# Patient Record
Sex: MALE | Race: WHITE | Employment: FULL TIME | URBAN - METROPOLITAN AREA
[De-identification: names, ages, dates, MRNs, and addresses within clinical notes are randomized per-mention and may not be internally consistent; named-entity substitution may affect disease eponyms.]

---

## 2017-03-13 PROBLEM — M19.90 ARTHRITIS: Status: ACTIVE | Noted: 2017-03-13

## 2017-03-13 PROBLEM — G43.909 MIGRAINES: Status: ACTIVE | Noted: 2017-03-13

## 2017-03-13 PROBLEM — E78.5 HYPERLIPIDEMIA, UNSPECIFIED: Status: ACTIVE | Noted: 2017-03-13

## 2017-03-13 PROBLEM — F51.01 PRIMARY INSOMNIA: Chronic | Status: ACTIVE | Noted: 2017-03-13

## 2017-03-13 PROBLEM — M19.90 OSTEOARTHRITIS: Status: ACTIVE | Noted: 2017-03-13

## 2017-03-13 PROBLEM — R35.1 NOCTURIA: Status: ACTIVE | Noted: 2017-03-13

## 2017-03-13 PROBLEM — K58.9 IBS (IRRITABLE BOWEL SYNDROME): Status: ACTIVE | Noted: 2017-03-13

## 2017-03-13 PROBLEM — Z87.442 HISTORY OF KIDNEY STONES: Status: ACTIVE | Noted: 2017-03-13

## 2017-04-07 ENCOUNTER — HOSPITAL ENCOUNTER (OUTPATIENT)
Dept: PHYSICAL THERAPY | Age: 57
Discharge: HOME | End: 2017-04-07
Payer: COMMERCIAL

## 2017-04-07 DIAGNOSIS — M25.511 CHRONIC RIGHT SHOULDER PAIN: ICD-10-CM

## 2017-04-07 DIAGNOSIS — G89.29 CHRONIC RIGHT SHOULDER PAIN: ICD-10-CM

## 2017-04-07 DIAGNOSIS — M54.2 NECK PAIN: ICD-10-CM

## 2017-04-07 PROCEDURE — 97140 MANUAL THERAPY 1/> REGIONS: CPT

## 2017-04-07 PROCEDURE — 97110 THERAPEUTIC EXERCISES: CPT

## 2017-04-07 PROCEDURE — 97162 PT EVAL MOD COMPLEX 30 MIN: CPT

## 2017-04-07 NOTE — PROGRESS NOTES
She Rhodes  : [facility] [address], [place], [address] [place] [address]  [phone]   [phone]         OUTPATIENT PHYSICAL THERAPY:Initial Assessment 2017    ICD-10: Treatment Diagnosis 1: cervical radiculopathy (M54.12)              Treatment Diagnosis 2: right shoulder pain (M25.511)  Precautions: None  Allergies:   Review of patient's allergies indicates no known allergies. Fall Risk Score: 2 (? 5 = High Risk)  MD Orders: Evaluate and Treat MEDICAL/REFERRING DIAGNOSIS:  Neck pain [M54.2]  Chronic right shoulder pain [M25.511, G89.29]   DATE OF ONSET: 2016 with gradual onset of pain in the neck and shoulder, with recent onset of right arm numbness into the thumb  REFERRING PHYSICIAN: Joel Fine MD  RETURN PHYSICIAN APPOINTMENT: 2017     INITIAL ASSESSMENT:  Mr. Mireya Zamorano is a 64 y.o. male presenting with right sided arm and neck symptoms that started in 2016 gradually and recently produced right arm numbness into the thumb. He denies any history of trauma, but does report he sits a lot at a desk as a  and with flying for work every week. He reports no weakness into the upper extremity, but reports the pain is limiting his sleep, ability to sit at a desk, and ability to perform activities above shoulder height. He reports being eager to reduce his pain and learn to control his symptoms on his own. He is a good candidate for skilled intervention with services to include manual therapy, modalities as needed, therapeutic exercises, postural re-education, and activity modification. PROBLEM LIST (Impacting functional limitations):  1. Decreased Strength  2. Decreased ADL/Functional Activities  3. Decreased Transfer Abilities  4. Decreased Ambulation Ability/Technique  5. Increased Pain  6. Decreased Activity Tolerance  7. Increased Fatigue  8.  Decreased Flexibility/Joint Mobility INTERVENTIONS PLANNED:  1. Cold  2. Cryotherapy  3. Electrical Stimulation  4. Heat  5. Home Exercise Program (HEP)  6. Manual Therapy  7. Neuromuscular Re-education/Strengthening  8. Range of Motion (ROM)  9. Therapeutic Exercise/Strengthening  10. Ultrasound (US)   TREATMENT PLAN:  Effective Dates: 4/7/2017 TO 6/1/2017. Frequency/Duration: 1 times a week for 8 weeks  GOALS: (Goals have been discussed and agreed upon with patient.)  Short-Term Functional Goals: Time Frame: 4/7/2017 to 5/5/2017  1. Patient demonstrates independence with home exercise program without verbal cueing provided by therapist.  2. Improve seated posture with decreased forward head, forward shoulders and thoracic kyphosis with use of lumbar roll and appropriate adjustment of computer work space. 3. Improve flexibility of pectoralis minor and major in order to improve neutral spine posture for work and with travelling. Discharge Goals: Time Frame: 4/7/2017 to 6/1/2017  1. Improve pain to 4/10 at the most with sitting for long periods of time, flying weekly, and sleeping. 2. Improve gross strength of rotator cuff, thoracic spinal extensors, deep cervical flexors, and scapular retractors to at least 4/5 in order to normalize tolerance to sitting for long periods of time. 3. Improve tenderness to palpation and spasm of right cervical paraspinals, scalenes, upper trapezius, and levator scapulae in order to improve tolerance to sleeping. 4. Improve ability for patient to control upper extremity symptoms with use of cervical traction at home and with travelling. 5. Improve modification of activities with reduction of cervical extension by reducing overhead activity and bringing work to eye and shoulder level. 6. Improve Disabilities of the Arm, Shoulder and Hand Index score to 15/55 from 20/55.   Rehabilitation Potential For Stated Goals: Good  Regarding Oscar Reus therapy, I certify that the treatment plan above will be carried out by a therapist or under their direction. Thank you for this referral,  Raad Abdi PT     Referring Physician Signature: Jah Mitchell MD              Date                    The information in this section was collected on 4/7/2017 (except where otherwise noted). HISTORY:   History of Present Injury/Illness (Reason for Referral):  Mr. Beryle Hews is a 64 y.o. male presenting with right sided arm and neck symptoms that started in 8/2016 gradually and recently produced right arm numbness into the thumb. He denies any history of trauma, but does report he sits a lot at a desk as a  and with flying for work every week. He reports no weakness into the upper extremity, but reports the pain is limiting his sleep, ability to sit at a desk, and ability to perform activities above shoulder height. He reports being eager to reduce his pain and learn to control his symptoms on his own. Past Medical History/Comorbidities:   Mr. Beryle Hews  has a past medical history of Allergic rhinitis, cause unspecified (4/3/2014); Arthritis (3/13/2017); History of kidney stones (3/13/2017); Hyperlipidemia, unspecified (3/13/2017); IBS (irritable bowel syndrome) (3/13/2017); Migraines (3/13/2017); Nocturia (3/13/2017); and Osteoarthritis (3/13/2017). Mr. Beryle Hews  has a past surgical history that includes other surgical and other surgical.  Social History/Living Environment:    Patient lives at home with wife but flies weekly to [address] [place] [address] for work and lives in an apartment. Therefore, he flies 2 times a week. Otherwise, he is independent to modified independence with all ADLs, household chores, and household activities such as home improvements and yard work. Prior Level of Function/Work/Activity:  Independent without dysfunction or pain. Patient works as a  which requires sitting for long periods of time at a computer.   He is also very active in and around his home with yard work and home improvements. Dominant Side:         LEFT  Current Medications:       Current Outpatient Prescriptions:     zolpidem (AMBIEN) 10 mg tablet, Take 1 Tab by mouth nightly as needed for Sleep. Max Daily Amount: 10 mg., Disp: 30 Tab, Rfl: 1    finasteride (PROSCAR) 5 mg tablet, Take 1 Tab by mouth daily. , Disp: 90 Tab, Rfl: 3    montelukast (SINGULAIR) 10 mg tablet, Take 10 mg by mouth daily. , Disp: , Rfl:     EPINEPHrine (EPIPEN) 0.3 mg/0.3 mL injection, 0.3 mg by IntraMUSCular route once as needed. , Disp: , Rfl:     SYRINGE W-NEEDLE,DISPOSAB,1 ML (ALLERGY SYRINGE), by Does Not Apply route., Disp: , Rfl:     aspirin 81 mg chewable tablet, Take 81 mg by mouth daily. , Disp: , Rfl:     calcium-vitamin D (OYSTER SHELL) 500 mg(1,250mg) -200 unit per tablet, Take 1 Tab by mouth two (2) times daily (with meals). , Disp: , Rfl:     fexofenadine (ALLEGRA) 180 mg tablet, Take  by mouth., Disp: , Rfl:     triamcinolone (NASACORT AQ) 55 mcg nasal inhaler, 2 Sprays daily. , Disp: , Rfl:    Date Last Reviewed:  4/7/2017   Number of Personal Factors/Comorbidities that affect the Plan of Care (job working at a computer, and high levels of activities in and around his home): 1-2: [address]:     Patient denies any increase of symptoms with coughing, sneezing or valsalva maneuver. Patient denies any headaches, changes in vision, dizziness, vertigo, nausea, drop attacks, black outs, tinnitus, dysphagia, dysarthria, LE symptoms or bowel/bladder dysfunction. Observation/Orthostatic Postural Assessment: Patient sits with mild to moderate forward head, forward shoulders and thoracic kyphosis. He is able to pull himself into neutral grossly without cuing, but still tends to hold his head in extension. He is able to reverse this with cuing but returns to this position due to poor postural awareness. No significant deformity is noted.      Palpation: Gross tenderness to palpation and spasm of scalenes, upper trapezius, levator scapulae and suboccipitals on the right. Flexibility is severely limited of the upper trapezius, levator scapulae, suboccipitals, and pectoralis minor and major. Vertebral-Basilar Screen: Hautant's test is negative. Cranial extension test is negative. ROM: AROM of bilateral upper extremities is symmetrical, WNL and pain free. Cervical extension (degrees): 55°   Cervical flexion: 65°   Cervical left side bend: 45°   Cervical right side bend: 45°   Cervical left rotation: 70°   Cervical right rotation: 70°     Strength:  Deep cervical flexors 3-/5, scapular retractors 4/5, thoracic spinal extensors 4-/5. Joint Mobility:  Mild to moderate limitations with cervical traction. Severe limitations noted with posterior glide of occiput on C1 bilaterally. Special Tests: Ligament stress tests performed through upper cervical spine for transverse ligament including Sharp-Elizabeth test is negative, and [place]-Axis shear test is negative. [place]-Axis side flexion test for integrity of alar ligament is negative. Spurling test is positive on the right for upper extremity symptoms. Cervical distraction is positive for relief of neck pain and arm symptoms on the right. Neurovascular testing for thoracic outlet syndrome is negative. Negative impingement testing (Neer and Hartmann), negative painful arc, negative rotator cuff testing, positive Meck[place]'s for clunk but no pain in the shoulder, positive Clunk test.    Neurological Screen:  Myotomes: Key muscle strength testing for bilateral UE is WNL. Dermatomes: Sensation testing through bilateral upper quadrants for light touch is WNL. Reflexes: Biceps (C5), brachioradialis (C6), and triceps (C7) are **2+, 2+ and 2+. Neural tension tests: Upper limb tension test is negative. Slump test is negative. Functional Mobility:  Patient reports independence to modified independence with all ADLs, household chores, and personal hygiene.   However, he requires assistance with moderate to heavy lifting and activities performed overhead. Body Structures Involved:  1. Bones  2. Joints  3. Muscles  4. Ligaments Body Functions Affected:  1. Sensory/Pain  2. Neuromusculoskeletal Activities and Participation Affected:  1. Domestic Life  2. Job working at a computer   Number of elements (examined above) that affect the Plan of Care: 3: MODERATE COMPLEXITY   CLINICAL PRESENTATION:   Presentation: Evolving clinical presentation with changing clinical characteristics: MODERATE COMPLEXITY   CLINICAL DECISION MAKING:   Outcome Measure: Tool Used: Disabilities of the Arm, Shoulder and Hand (DASH) Questionnaire - Quick Version  Score:  Initial: 20/55  Most Recent: X/55 (Date: -- )   Interpretation of Score: The DASH is designed to measure the activities of daily living in person's with upper extremity dysfunction or pain. Each section is scored on a 1-5 scale, 5 representing the greatest disability. The scores of each section are added together for a total score of 55. Tool Used: Neck Disability Index (NDI)  Score:  Initial: 1/50  Most Recent: /50 (Date: -- )   Interpretation of Score: The Neck Disability Index is a revised form of the Oswestry Low Back Pain Index and is designed to measure the activities of daily living in person's with neck pain. Each section is scored on a 0-5 scale, 5 representing the greatest disability. The scores of each section are added together for a total score of 50. Medical Necessity:   · Patient is expected to demonstrate progress in strength, range of motion, balance and coordination to improve tolerance to home improvement projects, yard work, work at a computer and sleeping. · Skilled intervention continues to be required due to weakness, decreased ROM, decreased flexibility, decreased joint mobility, postural dysfunction, and tenderness to palpation.   Reason for Services/Other Comments:  · Patient continues to require skilled intervention due to increased complexity of exercises. Use of outcome tool(s) and clinical judgement create a POC that gives a: Questionable prediction of patient's progress: MODERATE COMPLEXITY            TREATMENT:   (In addition to Assessment/Re-Assessment sessions the following treatments were rendered)    Pre-treatment Symptoms/Complaints:  Patient reported less pain overall since the cortisone injection in the right shoulder but he still experiences intermittent right arm numbness and tingling. Pain: Initial:   Pain Intensity 1: 8  Pain Location 1: Arm, Hand, Shoulder, Neck  Pain Orientation 1: Right  Post Session:  4/10     Therapeutic Exercise: (15 Minutes):  Exercises per grid below to improve mobility, strength, balance and coordination. Required minimal visual, verbal and manual cues to promote proper body alignment, promote proper body posture and promote proper body mechanics. Progressed resistance, range, repetitions and complexity of movement as indicated.   (used abbreviations: BET-back education training) Date:  4/7/2017 Date:   Date:     Activity/Exercise Parameters Parameters Parameters   Seated scapular retraction 2 x 10     Levator scapulae stretch 3 x 30 sec     Upper thoracic stretch 3 x 30 sec     Pec minor stretch with arms behind back 3 x 30 sec     Doorway stretch 3 x 30 sec     ER/IR band Green 3 x 10               Manual Therapy (    Soft Tissue Mobilization Duration  Duration: 15 Minutes): improve joint and soft tissue mobility  · Supine traction of the cervical spine to decrease radicular symptoms in the right upper extremity  · Supine stretching of upper trapezius and levator scapulae  · Supine deep tissue mobilization of right scalenes and cervical paraspinals  (Used abbreviations: MET - muscle energy technique; PNF - proprioceptive neuromuscular facilitation; NMR - neuromuscular re-education; a/p - anterior to posterior; p/a - posterior to anterio; NT - not tested)    Therapeutic Modalities: for edema and pain                                                                                               HEP: As above; handouts given to patient for all exercises. Treatment/Session Assessment:    · Response to Treatment:  Patient reported that traction improved his symptoms the most.  He was recommended to get an at home pneumatic cervical traction unit to order online. He reported a good understanding of HEP and reported no complaints overall with HEP. · Compliance with Program/Exercises: compliant most of the time. · Recommendations/Intent for next treatment session: \"Next visit will focus on advancements to more challenging activities\".     Total Treatment Duration: 60 minutes; 30 evaluation, 15 exercises, 15 manual therapy  PT Patient Time In/Time Out  Time In: 0900  Time Out: [address], PT

## 2017-04-07 NOTE — PROGRESS NOTES
Ambulatory/Rehab Services H2 Model Falls Risk Assessment    Risk Factor Pts. ·   Confusion/Disorientation/Impulsivity  []    4 ·   Symptomatic Depression  []   2 ·   Altered Elimination  []   1 ·   Dizziness/Vertigo  []   1 ·   Gender (Male)  [x]   1 ·   Any administered antiepileptics (anticonvulsants):  []   2 ·   Any administered benzodiazepines:  []   1 ·   Visual Impairment (specify):  []   1 ·   Portable Oxygen Use  []   1 ·   Orthostatic ? BP  []   1 ·   History of Recent Falls (within 3 mos.)  []   5     Ability to Rise from Chair (choose one) Pts. ·   Ability to rise in a single movement  []   0 ·   Pushes up, successful in one attempt  [x]   1 ·   Multiple attempts, but successful  []   3 ·   Unable to rise without assistance  []   4   Total: (5 or greater = High Risk) 2     Falls Prevention Plan:   []                Physical Limitations to Exercise (specify):   []                Mobility Assistance Device (type):   []                Exercise/Equipment Adaptation (specify):    ©2010 [facility] [address] Patent #[id number].  Federal Law prohibits the replication, distribution or use without written permission from [facility] [employer]

## 2017-04-21 ENCOUNTER — HOSPITAL ENCOUNTER (OUTPATIENT)
Dept: PHYSICAL THERAPY | Age: 57
Discharge: HOME | End: 2017-04-21
Payer: COMMERCIAL

## 2017-04-21 PROCEDURE — 97140 MANUAL THERAPY 1/> REGIONS: CPT

## 2017-04-21 PROCEDURE — 97110 THERAPEUTIC EXERCISES: CPT

## 2017-04-21 PROCEDURE — 97014 ELECTRIC STIMULATION THERAPY: CPT

## 2017-04-21 NOTE — PROGRESS NOTES
Erika Rodrigues  : [facility] [address], [place], [address] [place] [address]  [phone]   [phone]         OUTPATIENT PHYSICAL THERAPY:Daily Note 2017    ICD-10: Treatment Diagnosis 1: cervical radiculopathy (M54.12)              Treatment Diagnosis 2: right shoulder pain (M25.511)  Precautions: None  Allergies:   Review of patient's allergies indicates no known allergies. Fall Risk Score: 2 (? 5 = High Risk)  MD Orders: Evaluate and Treat MEDICAL/REFERRING DIAGNOSIS:  Cervicalgia [M54.2]  Pain in right shoulder [M25.511]  Other chronic pain [G89.29]   DATE OF ONSET: 2016 with gradual onset of pain in the neck and shoulder, with recent onset of right arm numbness into the thumb  REFERRING PHYSICIAN: Joesph Weiss MD  RETURN PHYSICIAN APPOINTMENT: 2017     INITIAL ASSESSMENT:  Mr. Jose Verdugo is a 64 y.o. male presenting with right sided arm and neck symptoms that started in 2016 gradually and recently produced right arm numbness into the thumb. He denies any history of trauma, but does report he sits a lot at a desk as a  and with flying for work every week. He reports no weakness into the upper extremity, but reports the pain is limiting his sleep, ability to sit at a desk, and ability to perform activities above shoulder height. He reports being eager to reduce his pain and learn to control his symptoms on his own. He is a good candidate for skilled intervention with services to include manual therapy, modalities as needed, therapeutic exercises, postural re-education, and activity modification. PROBLEM LIST (Impacting functional limitations):  1. Decreased Strength  2. Decreased ADL/Functional Activities  3. Decreased Transfer Abilities  4. Decreased Ambulation Ability/Technique  5. Increased Pain  6. Decreased Activity Tolerance  7. Increased Fatigue  8.  Decreased Flexibility/Joint Mobility INTERVENTIONS PLANNED:  1. Cold  2. Cryotherapy  3. Electrical Stimulation  4. Heat  5. Home Exercise Program (HEP)  6. Manual Therapy  7. Neuromuscular Re-education/Strengthening  8. Range of Motion (ROM)  9. Therapeutic Exercise/Strengthening  10. Ultrasound (US)   TREATMENT PLAN:  Effective Dates: 4/7/2017 TO 6/1/2017. Frequency/Duration: 1 times a week for 8 weeks  GOALS: (Goals have been discussed and agreed upon with patient.)  Short-Term Functional Goals: Time Frame: 4/7/2017 to 5/5/2017  1. Patient demonstrates independence with home exercise program without verbal cueing provided by therapist.  2. Improve seated posture with decreased forward head, forward shoulders and thoracic kyphosis with use of lumbar roll and appropriate adjustment of computer work space. 3. Improve flexibility of pectoralis minor and major in order to improve neutral spine posture for work and with travelling. Discharge Goals: Time Frame: 4/7/2017 to 6/1/2017  1. Improve pain to 4/10 at the most with sitting for long periods of time, flying weekly, and sleeping. 2. Improve gross strength of rotator cuff, thoracic spinal extensors, deep cervical flexors, and scapular retractors to at least 4/5 in order to normalize tolerance to sitting for long periods of time. 3. Improve tenderness to palpation and spasm of right cervical paraspinals, scalenes, upper trapezius, and levator scapulae in order to improve tolerance to sleeping. 4. Improve ability for patient to control upper extremity symptoms with use of cervical traction at home and with travelling. 5. Improve modification of activities with reduction of cervical extension by reducing overhead activity and bringing work to eye and shoulder level. 6. Improve Disabilities of the Arm, Shoulder and Hand Index score to 15/55 from 20/55.   Rehabilitation Potential For Stated Goals: Good  Regarding [facility] therapy, I certify that the treatment plan above will be carried out by a therapist or under their direction. Thank you for this referral,  Carson Penaloza PTA     Referring Physician Signature: Ludivina Bee MD              Date                    The information in this section was collected on 4/7/2017 (except where otherwise noted). HISTORY:   History of Present Injury/Illness (Reason for Referral):  Mr. Nathaly Miller is a 64 y.o. male presenting with right sided arm and neck symptoms that started in 8/2016 gradually and recently produced right arm numbness into the thumb. He denies any history of trauma, but does report he sits a lot at a desk as a  and with flying for work every week. He reports no weakness into the upper extremity, but reports the pain is limiting his sleep, ability to sit at a desk, and ability to perform activities above shoulder height. He reports being eager to reduce his pain and learn to control his symptoms on his own. Past Medical History/Comorbidities:   Mr. Nathaly Miller  has a past medical history of Allergic rhinitis, cause unspecified (4/3/2014); Arthritis (3/13/2017); History of kidney stones (3/13/2017); Hyperlipidemia, unspecified (3/13/2017); IBS (irritable bowel syndrome) (3/13/2017); Migraines (3/13/2017); Nocturia (3/13/2017); and Osteoarthritis (3/13/2017). Mr. Nathaly Miller  has a past surgical history that includes other surgical and other surgical.  Social History/Living Environment:    Patient lives at home with wife but flies weekly to [facility] for work and lives in an apartment. Therefore, he flies 2 times a week. Otherwise, he is independent to modified independence with all ADLs, household chores, and household activities such as home improvements and yard work. Prior Level of Function/Work/Activity:  Independent without dysfunction or pain. Patient works as a  which requires sitting for long periods of time at a computer.   He is also very active in and around his home with yard work and home improvements. Dominant Side:         LEFT  Current Medications:       Current Outpatient Prescriptions:     zolpidem (AMBIEN) 10 mg tablet, Take 1 Tab by mouth nightly as needed for Sleep. Max Daily Amount: 10 mg., Disp: 30 Tab, Rfl: 1    finasteride (PROSCAR) 5 mg tablet, Take 1 Tab by mouth daily. , Disp: 90 Tab, Rfl: 3    montelukast (SINGULAIR) 10 mg tablet, Take 10 mg by mouth daily. , Disp: , Rfl:     EPINEPHrine (EPIPEN) 0.3 mg/0.3 mL injection, 0.3 mg by IntraMUSCular route once as needed. , Disp: , Rfl:     SYRINGE W-NEEDLE,DISPOSAB,1 ML (ALLERGY SYRINGE), by Does Not Apply route., Disp: , Rfl:     aspirin 81 mg chewable tablet, Take 81 mg by mouth daily. , Disp: , Rfl:     calcium-vitamin D (OYSTER SHELL) 500 mg(1,250mg) -200 unit per tablet, Take 1 Tab by mouth two (2) times daily (with meals). , Disp: , Rfl:     fexofenadine (ALLEGRA) 180 mg tablet, Take  by mouth., Disp: , Rfl:     triamcinolone (NASACORT AQ) 55 mcg nasal inhaler, 2 Sprays daily. , Disp: , Rfl:    Date Last Reviewed:  4/21/2017   Number of Personal Factors/Comorbidities that affect the Plan of Care (job working at a computer, and high levels of activities in and around his home): 1-2: [address]:     Patient denies any increase of symptoms with coughing, sneezing or valsalva maneuver. Patient denies any headaches, changes in vision, dizziness, vertigo, nausea, drop attacks, black outs, tinnitus, dysphagia, dysarthria, LE symptoms or bowel/bladder dysfunction. Observation/Orthostatic Postural Assessment: Patient sits with mild to moderate forward head, forward shoulders and thoracic kyphosis. He is able to pull himself into neutral grossly without cuing, but still tends to hold his head in extension. He is able to reverse this with cuing but returns to this position due to poor postural awareness. No significant deformity is noted.      Palpation: Gross tenderness to palpation and spasm of scalenes, upper trapezius, levator scapulae and suboccipitals on the right. Flexibility is severely limited of the upper trapezius, levator scapulae, suboccipitals, and pectoralis minor and major. Vertebral-Basilar Screen: Hautant's test is negative. Cranial extension test is negative. ROM: AROM of bilateral upper extremities is symmetrical, WNL and pain free. Cervical extension (degrees): 55°   Cervical flexion: 65°   Cervical left side bend: 45°   Cervical right side bend: 45°   Cervical left rotation: 70°   Cervical right rotation: 70°     Strength:  Deep cervical flexors 3-/5, scapular retractors 4/5, thoracic spinal extensors 4-/5. Joint Mobility:  Mild to moderate limitations with cervical traction. Severe limitations noted with posterior glide of occiput on C1 bilaterally. Special Tests: Ligament stress tests performed through upper cervical spine for transverse ligament including Sharp-Elizabeth test is negative, and Galena-Axis shear test is negative. Galena-Axis side flexion test for integrity of alar ligament is negative. Spurling test is positive on the right for upper extremity symptoms. Cervical distraction is positive for relief of neck pain and arm symptoms on the right. Neurovascular testing for thoracic outlet syndrome is negative. Negative impingement testing (Neer and Hartmann), negative painful arc, negative rotator cuff testing, positive Mono's for clunk but no pain in the shoulder, positive Clunk test.    Neurological Screen:  Myotomes: Key muscle strength testing for bilateral UE is WNL. Dermatomes: Sensation testing through bilateral upper quadrants for light touch is WNL. Reflexes: Biceps (C5), brachioradialis (C6), and triceps (C7) are **2+, 2+ and 2+. Neural tension tests: Upper limb tension test is negative. Slump test is negative. Functional Mobility:  Patient reports independence to modified independence with all ADLs, household chores, and personal hygiene.   However, he requires assistance with moderate to heavy lifting and activities performed overhead. Body Structures Involved:  1. Bones  2. Joints  3. Muscles  4. Ligaments Body Functions Affected:  1. Sensory/Pain  2. Neuromusculoskeletal Activities and Participation Affected:  1. Domestic Life  2. Job working at a computer   Number of elements (examined above) that affect the Plan of Care: 3: MODERATE COMPLEXITY   CLINICAL PRESENTATION:   Presentation: Evolving clinical presentation with changing clinical characteristics: MODERATE COMPLEXITY   CLINICAL DECISION MAKING:   Outcome Measure: Tool Used: Disabilities of the Arm, Shoulder and Hand (DASH) Questionnaire - Quick Version  Score:  Initial: 20/55  Most Recent: X/55 (Date: -- )   Interpretation of Score: The DASH is designed to measure the activities of daily living in person's with upper extremity dysfunction or pain. Each section is scored on a 1-5 scale, 5 representing the greatest disability. The scores of each section are added together for a total score of 55. Tool Used: Neck Disability Index (NDI)  Score:  Initial: 1/50  Most Recent: /50 (Date: -- )   Interpretation of Score: The Neck Disability Index is a revised form of the Oswestry Low Back Pain Index and is designed to measure the activities of daily living in person's with neck pain. Each section is scored on a 0-5 scale, 5 representing the greatest disability. The scores of each section are added together for a total score of 50. Medical Necessity:   · Patient is expected to demonstrate progress in strength, range of motion, balance and coordination to improve tolerance to home improvement projects, yard work, work at a computer and sleeping. · Skilled intervention continues to be required due to weakness, decreased ROM, decreased flexibility, decreased joint mobility, postural dysfunction, and tenderness to palpation.   Reason for Services/Other Comments:  · Patient continues to require skilled intervention due to increased complexity of exercises. Use of outcome tool(s) and clinical judgement create a POC that gives a: Questionable prediction of patient's progress: MODERATE COMPLEXITY            TREATMENT:   (In addition to Assessment/Re-Assessment sessions the following treatments were rendered)    Pre-treatment Symptoms/Complaints:  Main C/O is intermittent tingling and numbness into right index finger and thumb. Pain: Initial:   Pain Intensity 1: 5  Pain Location 1: Arm, Hand, Shoulder  Pain Orientation 1: Right  Post Session: 3/10     Therapeutic Exercise: (20 Minutes):  Exercises per grid below to improve mobility, strength, balance and coordination. Required minimal visual, verbal and manual cues to promote proper body alignment, promote proper body posture and promote proper body mechanics. Progressed resistance, range, repetitions and complexity of movement as indicated. (used abbreviations: BET-back education training) Date:  4/7/2017 Date:  4-21-17 Date:     Activity/Exercise Parameters Parameters Parameters   Seated scapular retraction 2 x 10 3 x 10 with cueing    Levator scapulae stretch 3 x 30 sec 3 x 30 sec    Upper thoracic stretch 3 x 30 sec 3 x 30 sec    Pec minor stretch with arms behind back 3 x 30 sec 3 x 30 sec    Doorway stretch 3 x 30 sec 3 x 30 sec    ER/IR band Green 3 x 10 Green 3 x 10            Requires frequent verbal and tactile cues. Pt performs exercises too quick and aggressively. Very difficult for pt to slow down or relax.     Manual Therapy (    Soft Tissue Mobilization Duration  Duration: 25 Minutes): improve joint and soft tissue mobility  · Supine traction of the cervical spine to decrease radicular symptoms in the right upper extremity  · Supine stretching of upper trapezius and levator scapulae  · Supine and sitting deep tissue mobilization of right scalenes and cervical paraspinals  (Used abbreviations: MET - muscle energy technique; PNF - proprioceptive neuromuscular facilitation; NMR - neuromuscular re-education; a/p - anterior to posterior; p/a - posterior to anterio; NT - not tested)    Therapeutic Modalities: for edema and pain               Cervical Spine Heat  Type: Moist pack  Duration: 15 minutes  Patient Position: Sitting                             Cervical Spine Electrical Stimulation  Type: Interferential  Placement: R cervical spine and shoulder  # of Electrodes: 4  Duration : 15 minutes  Patient Position: Sitting                                                 HEP: As above; handouts given to patient for all exercises. Treatment/Session Assessment:    · Response to Treatment:  Decreased tingling with traction. .  · Compliance with Program/Exercises: compliant most of the time. · Recommendations/Intent for next treatment session: \"Next visit will focus on advancements to more challenging activities\".     Total Treatment Duration: 60 minutes  PT Patient Time In/Time Out  Time In: 0800  Time Out: 0900    Antionette Durham, FILEMON

## 2017-04-28 ENCOUNTER — HOSPITAL ENCOUNTER (OUTPATIENT)
Dept: PHYSICAL THERAPY | Age: 57
Discharge: HOME | End: 2017-04-28
Payer: COMMERCIAL

## 2017-04-28 PROCEDURE — 97140 MANUAL THERAPY 1/> REGIONS: CPT

## 2017-04-28 PROCEDURE — 97014 ELECTRIC STIMULATION THERAPY: CPT

## 2017-04-28 PROCEDURE — 97110 THERAPEUTIC EXERCISES: CPT

## 2017-04-28 NOTE — PROGRESS NOTES
Kirby Espinal  : [facility] [address], [address] [place] [address] Street  [phone]   [phone]         OUTPATIENT PHYSICAL THERAPY:Daily Note 2017    ICD-10: Treatment Diagnosis 1: cervical radiculopathy (M54.12)              Treatment Diagnosis 2: right shoulder pain (M25.511)  Precautions: None  Allergies:   Review of patient's allergies indicates no known allergies. Fall Risk Score: 2 (? 5 = High Risk)  MD Orders: Evaluate and Treat MEDICAL/REFERRING DIAGNOSIS:  Cervicalgia [M54.2]  Pain in right shoulder [M25.511]  Other chronic pain [G89.29]   DATE OF ONSET: 2016 with gradual onset of pain in the neck and shoulder, with recent onset of right arm numbness into the thumb  REFERRING PHYSICIAN: Adriana Roldan MD  RETURN PHYSICIAN APPOINTMENT: 2017     INITIAL ASSESSMENT:  Mr. Madeleine Grodon is a 64 y.o. male presenting with right sided arm and neck symptoms that started in 2016 gradually and recently produced right arm numbness into the thumb. He denies any history of trauma, but does report he sits a lot at a desk as a  and with flying for work every week. He reports no weakness into the upper extremity, but reports the pain is limiting his sleep, ability to sit at a desk, and ability to perform activities above shoulder height. He reports being eager to reduce his pain and learn to control his symptoms on his own. He is a good candidate for skilled intervention with services to include manual therapy, modalities as needed, therapeutic exercises, postural re-education, and activity modification. PROBLEM LIST (Impacting functional limitations):  1. Decreased Strength  2. Decreased ADL/Functional Activities  3. Decreased Transfer Abilities  4. Decreased Ambulation Ability/Technique  5. Increased Pain  6. Decreased Activity Tolerance  7. Increased Fatigue  8.  Decreased Flexibility/Joint Mobility INTERVENTIONS PLANNED:  1. Cold  2. Cryotherapy  3. Electrical Stimulation  4. Heat  5. Home Exercise Program (HEP)  6. Manual Therapy  7. Neuromuscular Re-education/Strengthening  8. Range of Motion (ROM)  9. Therapeutic Exercise/Strengthening  10. Ultrasound (US)   TREATMENT PLAN:  Effective Dates: 4/7/2017 TO 6/1/2017. Frequency/Duration: 1 times a week for 8 weeks  GOALS: (Goals have been discussed and agreed upon with patient.)  Short-Term Functional Goals: Time Frame: 4/7/2017 to 5/5/2017  1. Patient demonstrates independence with home exercise program without verbal cueing provided by therapist.  2. Improve seated posture with decreased forward head, forward shoulders and thoracic kyphosis with use of lumbar roll and appropriate adjustment of computer work space. 3. Improve flexibility of pectoralis minor and major in order to improve neutral spine posture for work and with travelling. Discharge Goals: Time Frame: 4/7/2017 to 6/1/2017  1. Improve pain to 4/10 at the most with sitting for long periods of time, flying weekly, and sleeping. 2. Improve gross strength of rotator cuff, thoracic spinal extensors, deep cervical flexors, and scapular retractors to at least 4/5 in order to normalize tolerance to sitting for long periods of time. 3. Improve tenderness to palpation and spasm of right cervical paraspinals, scalenes, upper trapezius, and levator scapulae in order to improve tolerance to sleeping. 4. Improve ability for patient to control upper extremity symptoms with use of cervical traction at home and with travelling. 5. Improve modification of activities with reduction of cervical extension by reducing overhead activity and bringing work to eye and shoulder level. 6. Improve Disabilities of the Arm, Shoulder and Hand Index score to 15/55 from 20/55.   Rehabilitation Potential For Stated Goals: Good  Regarding [facility] therapy, I certify that the treatment plan above will be carried out by a therapist or under their direction. Thank you for this referral,  Ras Cook PTA     Referring Physician Signature: Ki Walker MD              Date                    The information in this section was collected on 4/7/2017 (except where otherwise noted). HISTORY:   History of Present Injury/Illness (Reason for Referral):  Mr. Enzo Alejandra is a 64 y.o. male presenting with right sided arm and neck symptoms that started in 8/2016 gradually and recently produced right arm numbness into the thumb. He denies any history of trauma, but does report he sits a lot at a desk as a  and with flying for work every week. He reports no weakness into the upper extremity, but reports the pain is limiting his sleep, ability to sit at a desk, and ability to perform activities above shoulder height. He reports being eager to reduce his pain and learn to control his symptoms on his own. Past Medical History/Comorbidities:   Mr. Enzo Alejandra  has a past medical history of Allergic rhinitis, cause unspecified (4/3/2014); Arthritis (3/13/2017); History of kidney stones (3/13/2017); Hyperlipidemia, unspecified (3/13/2017); IBS (irritable bowel syndrome) (3/13/2017); Migraines (3/13/2017); Nocturia (3/13/2017); and Osteoarthritis (3/13/2017). Mr. Enzo Alejandra  has a past surgical history that includes other surgical and other surgical.  Social History/Living Environment:    Patient lives at home with wife but flies weekly to [facility] for work and lives in an apartment. Therefore, he flies 2 times a week. Otherwise, he is independent to modified independence with all ADLs, household chores, and household activities such as home improvements and yard work. Prior Level of Function/Work/Activity:  Independent without dysfunction or pain. Patient works as a  which requires sitting for long periods of time at a computer.   He is also very active in and around his home with yard work and home improvements. Dominant Side:         LEFT  Current Medications:       Current Outpatient Prescriptions:     zolpidem (AMBIEN) 10 mg tablet, Take 1 Tab by mouth nightly as needed for Sleep. Max Daily Amount: 10 mg., Disp: 30 Tab, Rfl: 1    finasteride (PROSCAR) 5 mg tablet, Take 1 Tab by mouth daily. , Disp: 90 Tab, Rfl: 3    montelukast (SINGULAIR) 10 mg tablet, Take 10 mg by mouth daily. , Disp: , Rfl:     EPINEPHrine (EPIPEN) 0.3 mg/0.3 mL injection, 0.3 mg by IntraMUSCular route once as needed. , Disp: , Rfl:     SYRINGE W-NEEDLE,DISPOSAB,1 ML (ALLERGY SYRINGE), by Does Not Apply route., Disp: , Rfl:     aspirin 81 mg chewable tablet, Take 81 mg by mouth daily. , Disp: , Rfl:     calcium-vitamin D (OYSTER SHELL) 500 mg(1,250mg) -200 unit per tablet, Take 1 Tab by mouth two (2) times daily (with meals). , Disp: , Rfl:     fexofenadine (ALLEGRA) 180 mg tablet, Take  by mouth., Disp: , Rfl:     triamcinolone (NASACORT AQ) 55 mcg nasal inhaler, 2 Sprays daily. , Disp: , Rfl:    Date Last Reviewed:  4/28/2017   Number of Personal Factors/Comorbidities that affect the Plan of Care (job working at a computer, and high levels of activities in and around his home): 1-2: [address]:     Patient denies any increase of symptoms with coughing, sneezing or valsalva maneuver. Patient denies any headaches, changes in vision, dizziness, vertigo, nausea, drop attacks, black outs, tinnitus, dysphagia, dysarthria, LE symptoms or bowel/bladder dysfunction. Observation/Orthostatic Postural Assessment: Patient sits with mild to moderate forward head, forward shoulders and thoracic kyphosis. He is able to pull himself into neutral grossly without cuing, but still tends to hold his head in extension. He is able to reverse this with cuing but returns to this position due to poor postural awareness. No significant deformity is noted.      Palpation: Gross tenderness to palpation and spasm of scalenes, upper trapezius, levator scapulae and suboccipitals on the right. Flexibility is severely limited of the upper trapezius, levator scapulae, suboccipitals, and pectoralis minor and major. Vertebral-Basilar Screen: Hautant's test is negative. Cranial extension test is negative. ROM: AROM of bilateral upper extremities is symmetrical, WNL and pain free. Cervical extension (degrees): 55°   Cervical flexion: 65°   Cervical left side bend: 45°   Cervical right side bend: 45°   Cervical left rotation: 70°   Cervical right rotation: 70°     Strength:  Deep cervical flexors 3-/5, scapular retractors 4/5, thoracic spinal extensors 4-/5. Joint Mobility:  Mild to moderate limitations with cervical traction. Severe limitations noted with posterior glide of occiput on C1 bilaterally. Special Tests: Ligament stress tests performed through upper cervical spine for transverse ligament including Sharp-Elizabeth test is negative, and Hayward-Axis shear test is negative. Hayward-Axis side flexion test for integrity of alar ligament is negative. Spurling test is positive on the right for upper extremity symptoms. Cervical distraction is positive for relief of neck pain and arm symptoms on the right. Neurovascular testing for thoracic outlet syndrome is negative. Negative impingement testing (Neer and Hartmann), negative painful arc, negative rotator cuff testing, positive Menard's for clunk but no pain in the shoulder, positive Clunk test.    Neurological Screen:  Myotomes: Key muscle strength testing for bilateral UE is WNL. Dermatomes: Sensation testing through bilateral upper quadrants for light touch is WNL. Reflexes: Biceps (C5), brachioradialis (C6), and triceps (C7) are **2+, 2+ and 2+. Neural tension tests: Upper limb tension test is negative. Slump test is negative. Functional Mobility:  Patient reports independence to modified independence with all ADLs, household chores, and personal hygiene.   However, he requires assistance with moderate to heavy lifting and activities performed overhead. Body Structures Involved:  1. Bones  2. Joints  3. Muscles  4. Ligaments Body Functions Affected:  1. Sensory/Pain  2. Neuromusculoskeletal Activities and Participation Affected:  1. Domestic Life  2. Job working at a computer   Number of elements (examined above) that affect the Plan of Care: 3: MODERATE COMPLEXITY   CLINICAL PRESENTATION:   Presentation: Evolving clinical presentation with changing clinical characteristics: MODERATE COMPLEXITY   CLINICAL DECISION MAKING:   Outcome Measure: Tool Used: Disabilities of the Arm, Shoulder and Hand (DASH) Questionnaire - Quick Version  Score:  Initial: 20/55  Most Recent: X/55 (Date: -- )   Interpretation of Score: The DASH is designed to measure the activities of daily living in person's with upper extremity dysfunction or pain. Each section is scored on a 1-5 scale, 5 representing the greatest disability. The scores of each section are added together for a total score of 55. Tool Used: Neck Disability Index (NDI)  Score:  Initial: 1/50  Most Recent: /50 (Date: -- )   Interpretation of Score: The Neck Disability Index is a revised form of the Oswestry Low Back Pain Index and is designed to measure the activities of daily living in person's with neck pain. Each section is scored on a 0-5 scale, 5 representing the greatest disability. The scores of each section are added together for a total score of 50. Medical Necessity:   · Patient is expected to demonstrate progress in strength, range of motion, balance and coordination to improve tolerance to home improvement projects, yard work, work at a computer and sleeping. · Skilled intervention continues to be required due to weakness, decreased ROM, decreased flexibility, decreased joint mobility, postural dysfunction, and tenderness to palpation.   Reason for Services/Other Comments:  · Patient continues to require skilled intervention due to increased complexity of exercises. Use of outcome tool(s) and clinical judgement create a POC that gives a: Questionable prediction of patient's progress: MODERATE COMPLEXITY            TREATMENT:   (In addition to Assessment/Re-Assessment sessions the following treatments were rendered)    Pre-treatment Symptoms/Complaints:  Main C/O is intermittent tingling and numbness into right index finger and thumb. also reports intermittent numbness in 4th-5th fingers past few days. Patient states numbness increases with left cervical lateral flexion  Pain: Initial:   Pain Intensity 1: 5  Pain Location 1: Arm, Shoulder, Hand  Pain Orientation 1: Right  Post Session: 1/10     Therapeutic Exercise: (25 Minutes):  Exercises per grid below to improve mobility, strength, balance and coordination. Required minimal visual, verbal and manual cues to promote proper body alignment, promote proper body posture and promote proper body mechanics. Progressed resistance, range, repetitions and complexity of movement as indicated. (used abbreviations: BET-back education training) Date:  4/7/2017 Date:  4-21-17 Date:  4-28-`17   Activity/Exercise Parameters Parameters Parameters   Seated scapular retraction 2 x 10 3 x 10 with cueing 2 x 10   Levator scapulae stretch 3 x 30 sec 3 x 30 sec 2 x 30 sec   Upper thoracic stretch 3 x 30 sec 3 x 30 sec    Pec minor stretch with arms behind back 3 x 30 sec 3 x 30 sec    Doorway stretch 3 x 30 sec 3 x 30 sec 3 x 30 sec   ER/IR band Green 3 x 10 Green 3 x 10 Green 2 x 12   UBE   4 min forward  2 min reverse   Rows   Blue band 2 x 12   Low rows   Green 2 x 12                 Requires frequent verbal and tactile cues. Pt performs exercises too quick and aggressively. Very difficult for pt to slow down or relax.     Manual Therapy (    Soft Tissue Mobilization Duration  Duration: 15 Minutes): improve joint and soft tissue mobility  Sitting deep tissue mobilization of right scalenes,upper trapezius,rhomboids and cervical paraspinals. (Used abbreviations: MET - muscle energy technique; PNF - proprioceptive neuromuscular facilitation; NMR - neuromuscular re-education; a/p - anterior to posterior; p/a - posterior to anterio; NT - not tested)    Therapeutic Modalities: for edema and pain               Cervical Spine Heat  Type: Moist pack  Duration: 15 minutes  Patient Position: Sitting                             Cervical Spine Electrical Stimulation  Type: Interferential  Placement: R cervical region  # of Electrodes: 4  Duration : 15 minutes  Patient Position: Sitting                                                 HEP: As above; handouts given to patient for all exercises. Treatment/Session Assessment:    · Response to Treatment:  Decreased tingling with deep tissue mobilization. Decreased pain and spasm right upper trapezius and rhomboids. .  · Compliance with Program/Exercises: compliant most of the time. · Recommendations/Intent for next treatment session: \"Next visit will focus on advancements to more challenging activities\".     Total Treatment Duration: 55 minutes  PT Patient Time In/Time Out  Time In: 0800  Time Out: 0900    Tony Quintanilla, PTA

## 2017-05-05 ENCOUNTER — HOSPITAL ENCOUNTER (OUTPATIENT)
Dept: PHYSICAL THERAPY | Age: 57
Discharge: HOME | End: 2017-05-05
Payer: COMMERCIAL

## 2017-05-05 PROCEDURE — 97014 ELECTRIC STIMULATION THERAPY: CPT

## 2017-05-05 PROCEDURE — 97110 THERAPEUTIC EXERCISES: CPT

## 2017-05-05 PROCEDURE — 97140 MANUAL THERAPY 1/> REGIONS: CPT

## 2017-05-05 NOTE — PROGRESS NOTES
Fannie Robertson  : [facility] [address], [place], [address] [place] [address]  [phone]   [phone]         OUTPATIENT PHYSICAL THERAPY:Daily Note 2017    ICD-10: Treatment Diagnosis 1: cervical radiculopathy (M54.12)              Treatment Diagnosis 2: right shoulder pain (M25.511)  Precautions: None  Allergies:   Review of patient's allergies indicates no known allergies. Fall Risk Score: 2 (? 5 = High Risk)  MD Orders: Evaluate and Treat MEDICAL/REFERRING DIAGNOSIS:  Cervicalgia [M54.2]  Pain in right shoulder [M25.511]  Other chronic pain [G89.29]   DATE OF ONSET: 2016 with gradual onset of pain in the neck and shoulder, with recent onset of right arm numbness into the thumb  REFERRING PHYSICIAN: Ute Angel MD  RETURN PHYSICIAN APPOINTMENT: 2017     INITIAL ASSESSMENT:  Mr. Yulia Jackson is a 64 y.o. male presenting with right sided arm and neck symptoms that started in 2016 gradually and recently produced right arm numbness into the thumb. He denies any history of trauma, but does report he sits a lot at a desk as a  and with flying for work every week. He reports no weakness into the upper extremity, but reports the pain is limiting his sleep, ability to sit at a desk, and ability to perform activities above shoulder height. He reports being eager to reduce his pain and learn to control his symptoms on his own. He is a good candidate for skilled intervention with services to include manual therapy, modalities as needed, therapeutic exercises, postural re-education, and activity modification. PROBLEM LIST (Impacting functional limitations):  1. Decreased Strength  2. Decreased ADL/Functional Activities  3. Decreased Transfer Abilities  4. Decreased Ambulation Ability/Technique  5. Increased Pain  6. Decreased Activity Tolerance  7. Increased Fatigue  8.  Decreased Flexibility/Joint Mobility INTERVENTIONS PLANNED:  1. Cold  2. Cryotherapy  3. Electrical Stimulation  4. Heat  5. Home Exercise Program (HEP)  6. Manual Therapy  7. Neuromuscular Re-education/Strengthening  8. Range of Motion (ROM)  9. Therapeutic Exercise/Strengthening  10. Ultrasound (US)   TREATMENT PLAN:  Effective Dates: 4/7/2017 TO 6/1/2017. Frequency/Duration: 1 times a week for 8 weeks  GOALS: (Goals have been discussed and agreed upon with patient.)  Short-Term Functional Goals: Time Frame: 4/7/2017 to 5/5/2017  1. Patient demonstrates independence with home exercise program without verbal cueing provided by therapist.  2. Improve seated posture with decreased forward head, forward shoulders and thoracic kyphosis with use of lumbar roll and appropriate adjustment of computer work space. 3. Improve flexibility of pectoralis minor and major in order to improve neutral spine posture for work and with travelling. Discharge Goals: Time Frame: 4/7/2017 to 6/1/2017  1. Improve pain to 4/10 at the most with sitting for long periods of time, flying weekly, and sleeping. 2. Improve gross strength of rotator cuff, thoracic spinal extensors, deep cervical flexors, and scapular retractors to at least 4/5 in order to normalize tolerance to sitting for long periods of time. 3. Improve tenderness to palpation and spasm of right cervical paraspinals, scalenes, upper trapezius, and levator scapulae in order to improve tolerance to sleeping. 4. Improve ability for patient to control upper extremity symptoms with use of cervical traction at home and with travelling. 5. Improve modification of activities with reduction of cervical extension by reducing overhead activity and bringing work to eye and shoulder level. 6. Improve Disabilities of the Arm, Shoulder and Hand Index score to 15/55 from 20/55.   Rehabilitation Potential For Stated Goals: Good  Regarding [facility] therapy, I certify that the treatment plan above will be carried out by a therapist or under their direction. Thank you for this referral,  Kannan Alvarado PTA     Referring Physician Signature: Kya Lau MD              Date                    The information in this section was collected on 4/7/2017 (except where otherwise noted). HISTORY:   History of Present Injury/Illness (Reason for Referral):  Mr. Sandie Emanuel is a 64 y.o. male presenting with right sided arm and neck symptoms that started in 8/2016 gradually and recently produced right arm numbness into the thumb. He denies any history of trauma, but does report he sits a lot at a desk as a  and with flying for work every week. He reports no weakness into the upper extremity, but reports the pain is limiting his sleep, ability to sit at a desk, and ability to perform activities above shoulder height. He reports being eager to reduce his pain and learn to control his symptoms on his own. Past Medical History/Comorbidities:   Mr. Sandie Emanuel  has a past medical history of Allergic rhinitis, cause unspecified (4/3/2014); Arthritis (3/13/2017); History of kidney stones (3/13/2017); Hyperlipidemia, unspecified (3/13/2017); IBS (irritable bowel syndrome) (3/13/2017); Migraines (3/13/2017); Nocturia (3/13/2017); and Osteoarthritis (3/13/2017). Mr. Sandie Emanuel  has a past surgical history that includes other surgical and other surgical.  Social History/Living Environment:    Patient lives at home with wife but flies weekly to [facility] for work and lives in an apartment. Therefore, he flies 2 times a week. Otherwise, he is independent to modified independence with all ADLs, household chores, and household activities such as home improvements and yard work. Prior Level of Function/Work/Activity:  Independent without dysfunction or pain. Patient works as a  which requires sitting for long periods of time at a computer.   He is also very active in and around his home with yard work and home improvements. Dominant Side:         LEFT  Current Medications:       Current Outpatient Prescriptions:     zolpidem (AMBIEN) 10 mg tablet, Take 1 Tab by mouth nightly as needed for Sleep. Max Daily Amount: 10 mg., Disp: 30 Tab, Rfl: 1    finasteride (PROSCAR) 5 mg tablet, Take 1 Tab by mouth daily. , Disp: 90 Tab, Rfl: 3    montelukast (SINGULAIR) 10 mg tablet, Take 10 mg by mouth daily. , Disp: , Rfl:     EPINEPHrine (EPIPEN) 0.3 mg/0.3 mL injection, 0.3 mg by IntraMUSCular route once as needed. , Disp: , Rfl:     SYRINGE W-NEEDLE,DISPOSAB,1 ML (ALLERGY SYRINGE), by Does Not Apply route., Disp: , Rfl:     aspirin 81 mg chewable tablet, Take 81 mg by mouth daily. , Disp: , Rfl:     calcium-vitamin D (OYSTER SHELL) 500 mg(1,250mg) -200 unit per tablet, Take 1 Tab by mouth two (2) times daily (with meals). , Disp: , Rfl:     fexofenadine (ALLEGRA) 180 mg tablet, Take  by mouth., Disp: , Rfl:     triamcinolone (NASACORT AQ) 55 mcg nasal inhaler, 2 Sprays daily. , Disp: , Rfl:    Date Last Reviewed:  5/5/2017   Number of Personal Factors/Comorbidities that affect the Plan of Care (job working at a computer, and high levels of activities in and around his home): 1-2: [address]:     Patient denies any increase of symptoms with coughing, sneezing or valsalva maneuver. Patient denies any headaches, changes in vision, dizziness, vertigo, nausea, drop attacks, black outs, tinnitus, dysphagia, dysarthria, LE symptoms or bowel/bladder dysfunction. Observation/Orthostatic Postural Assessment: Patient sits with mild to moderate forward head, forward shoulders and thoracic kyphosis. He is able to pull himself into neutral grossly without cuing, but still tends to hold his head in extension. He is able to reverse this with cuing but returns to this position due to poor postural awareness. No significant deformity is noted.      Palpation: Gross tenderness to palpation and spasm of scalenes, upper trapezius, levator scapulae and suboccipitals on the right. Flexibility is severely limited of the upper trapezius, levator scapulae, suboccipitals, and pectoralis minor and major. Vertebral-Basilar Screen: Hautant's test is negative. Cranial extension test is negative. ROM: AROM of bilateral upper extremities is symmetrical, WNL and pain free. Cervical extension (degrees): 55°   Cervical flexion: 65°   Cervical left side bend: 45°   Cervical right side bend: 45°   Cervical left rotation: 70°   Cervical right rotation: 70°     Strength:  Deep cervical flexors 3-/5, scapular retractors 4/5, thoracic spinal extensors 4-/5. Joint Mobility:  Mild to moderate limitations with cervical traction. Severe limitations noted with posterior glide of occiput on C1 bilaterally. Special Tests: Ligament stress tests performed through upper cervical spine for transverse ligament including Sharp-Elizabeth test is negative, and Westley-Axis shear test is negative. Westley-Axis side flexion test for integrity of alar ligament is negative. Spurling test is positive on the right for upper extremity symptoms. Cervical distraction is positive for relief of neck pain and arm symptoms on the right. Neurovascular testing for thoracic outlet syndrome is negative. Negative impingement testing (Neer and Hartmann), negative painful arc, negative rotator cuff testing, positive Tarrant's for clunk but no pain in the shoulder, positive Clunk test.    Neurological Screen:  Myotomes: Key muscle strength testing for bilateral UE is WNL. Dermatomes: Sensation testing through bilateral upper quadrants for light touch is WNL. Reflexes: Biceps (C5), brachioradialis (C6), and triceps (C7) are **2+, 2+ and 2+. Neural tension tests: Upper limb tension test is negative. Slump test is negative. Functional Mobility:  Patient reports independence to modified independence with all ADLs, household chores, and personal hygiene.   However, he requires assistance with moderate to heavy lifting and activities performed overhead. Body Structures Involved:  1. Bones  2. Joints  3. Muscles  4. Ligaments Body Functions Affected:  1. Sensory/Pain  2. Neuromusculoskeletal Activities and Participation Affected:  1. Domestic Life  2. Job working at a computer   Number of elements (examined above) that affect the Plan of Care: 3: MODERATE COMPLEXITY   CLINICAL PRESENTATION:   Presentation: Evolving clinical presentation with changing clinical characteristics: MODERATE COMPLEXITY   CLINICAL DECISION MAKING:   Outcome Measure: Tool Used: Disabilities of the Arm, Shoulder and Hand (DASH) Questionnaire - Quick Version  Score:  Initial: 20/55  Most Recent: X/55 (Date: -- )   Interpretation of Score: The DASH is designed to measure the activities of daily living in person's with upper extremity dysfunction or pain. Each section is scored on a 1-5 scale, 5 representing the greatest disability. The scores of each section are added together for a total score of 55. Tool Used: Neck Disability Index (NDI)  Score:  Initial: 1/50  Most Recent: /50 (Date: -- )   Interpretation of Score: The Neck Disability Index is a revised form of the Oswestry Low Back Pain Index and is designed to measure the activities of daily living in person's with neck pain. Each section is scored on a 0-5 scale, 5 representing the greatest disability. The scores of each section are added together for a total score of 50. Medical Necessity:   · Patient is expected to demonstrate progress in strength, range of motion, balance and coordination to improve tolerance to home improvement projects, yard work, work at a computer and sleeping. · Skilled intervention continues to be required due to weakness, decreased ROM, decreased flexibility, decreased joint mobility, postural dysfunction, and tenderness to palpation.   Reason for Services/Other Comments:  · Patient continues to require skilled intervention due to increased complexity of exercises. Use of outcome tool(s) and clinical judgement create a POC that gives a: Questionable prediction of patient's progress: MODERATE COMPLEXITY            TREATMENT:   (In addition to Assessment/Re-Assessment sessions the following treatments were rendered)    Pre-treatment Symptoms/Complaints:  Main C/O is intermittent tingling and numbness into right index finger and thumb with certain positions. Pt states when he feels tingling he can move his arm and tingling decreases. Pain: Initial:   Pain Intensity 1: 5  Pain Location 1: Arm, Shoulder, Hand  Pain Orientation 1: Right  Post Session: 3/10     Therapeutic Exercise: (25 Minutes):  Exercises per grid below to improve mobility, strength, balance and coordination. Required minimal visual, verbal and manual cues to promote proper body alignment, promote proper body posture and promote proper body mechanics. Progressed resistance, range, repetitions and complexity of movement as indicated. (used abbreviations: BET-back education training) Date:  5-5-17 Date:  4-21-17 Date:  4-28-`17   Activity/Exercise Parameters Parameters Parameters   Seated scapular retraction  3 x 10 with cueing 2 x 10   Levator scapulae stretch 2 x 30 sec 3 x 30 sec 2 x 30 sec   Upper thoracic stretch 2 x 30 sec 3 x 30 sec    Pec minor stretch with arms behind back 2 x 30 sec 3 x 30 sec    Doorway stretch 2 x 30 sec 3 x 30 sec 3 x 30 sec   ER/IR band Green 2 x 15 Green 3 x 10 Green 2 x 12   UBE 3 min each direction level 4  4 min forward  2 min reverse   Rows Blue band 2 x 15  Blue band 2 x 12   Low rows Green band 2 x 15  Green 2 x 12                     Manual Therapy (    Soft Tissue Mobilization Duration  Duration: 20 Minutes): improve joint and soft tissue mobility   In supine manual cervical traction,occipital release,stretching upper trapezius and cervical mobilization.   Increased tingling and radiating pain with mobilization technique right C4-C6      (Used abbreviations: MET - muscle energy technique; PNF - proprioceptive neuromuscular facilitation; NMR - neuromuscular re-education; a/p - anterior to posterior; p/a - posterior to anterio; NT - not tested)    Therapeutic Modalities: for edema and pain               Cervical Spine Heat  Type: Moist pack  Duration: 15 minutes  Patient Position: Sitting                             Cervical Spine Electrical Stimulation  Type: Interferential  Placement: R cervical region  # of Electrodes: 4  Duration : 15 minutes  Patient Position: Sitting                                                 HEP: As above; handouts given to patient for all exercises. Treatment/Session Assessment:    · Response to Treatment:  Decreased tingling with cervical traction. .  · Compliance with Program/Exercises: compliant most of the time. · Recommendations/Intent for next treatment session: \"Next visit will focus on advancements to more challenging activities\".     Total Treatment Duration: 60 minutes  PT Patient Time In/Time Out  Time In: 7349  Time Out: 0900    Benedicto Jiménez, PTA

## 2017-05-12 ENCOUNTER — HOSPITAL ENCOUNTER (OUTPATIENT)
Dept: PHYSICAL THERAPY | Age: 57
Discharge: HOME | End: 2017-05-12
Payer: COMMERCIAL

## 2017-05-12 PROCEDURE — 97014 ELECTRIC STIMULATION THERAPY: CPT

## 2017-05-12 PROCEDURE — 97110 THERAPEUTIC EXERCISES: CPT

## 2017-05-12 PROCEDURE — 97140 MANUAL THERAPY 1/> REGIONS: CPT

## 2017-05-12 NOTE — PROGRESS NOTES
Faye Host  : [facility] [address], [place], [address] [place] [address]  [phone]   [phone]         OUTPATIENT PHYSICAL THERAPY:Daily Note 2017    ICD-10: Treatment Diagnosis 1: cervical radiculopathy (M54.12)              Treatment Diagnosis 2: right shoulder pain (M25.511)  Precautions: None  Allergies:   Review of patient's allergies indicates no known allergies. Fall Risk Score: 2 (? 5 = High Risk)  MD Orders: Evaluate and Treat MEDICAL/REFERRING DIAGNOSIS:  Cervicalgia [M54.2]  Pain in right shoulder [M25.511]  Other chronic pain [G89.29]   DATE OF ONSET: 2016 with gradual onset of pain in the neck and shoulder, with recent onset of right arm numbness into the thumb  REFERRING PHYSICIAN: Viviana Thornton MD  RETURN PHYSICIAN APPOINTMENT: 2017     INITIAL ASSESSMENT:  Mr. Arlene Jane is a 64 y.o. male presenting with right sided arm and neck symptoms that started in 2016 gradually and recently produced right arm numbness into the thumb. He denies any history of trauma, but does report he sits a lot at a desk as a  and with flying for work every week. He reports no weakness into the upper extremity, but reports the pain is limiting his sleep, ability to sit at a desk, and ability to perform activities above shoulder height. He reports being eager to reduce his pain and learn to control his symptoms on his own. He is a good candidate for skilled intervention with services to include manual therapy, modalities as needed, therapeutic exercises, postural re-education, and activity modification. PROBLEM LIST (Impacting functional limitations):  1. Decreased Strength  2. Decreased ADL/Functional Activities  3. Decreased Transfer Abilities  4. Decreased Ambulation Ability/Technique  5. Increased Pain  6. Decreased Activity Tolerance  7. Increased Fatigue  8.  Decreased Flexibility/Joint Mobility INTERVENTIONS PLANNED:  1. Cold  2. Cryotherapy  3. Electrical Stimulation  4. Heat  5. Home Exercise Program (HEP)  6. Manual Therapy  7. Neuromuscular Re-education/Strengthening  8. Range of Motion (ROM)  9. Therapeutic Exercise/Strengthening  10. Ultrasound (US)   TREATMENT PLAN:  Effective Dates: 4/7/2017 TO 6/1/2017. Frequency/Duration: 1 times a week for 8 weeks  GOALS: (Goals have been discussed and agreed upon with patient.)  Short-Term Functional Goals: Time Frame: 4/7/2017 to 5/5/2017  1. Patient demonstrates independence with home exercise program without verbal cueing provided by therapist. - GOAL MET  2. Improve seated posture with decreased forward head, forward shoulders and thoracic kyphosis with use of lumbar roll and appropriate adjustment of computer work space. - GOAL MET  3. Improve flexibility of pectoralis minor and major in order to improve neutral spine posture for work and with travelling. - GOAL MET  Discharge Goals: Time Frame: 4/7/2017 to 6/1/2017  1. Improve pain to 4/10 at the most with sitting for long periods of time, flying weekly, and sleeping. - ONGOING  2. Improve gross strength of rotator cuff, thoracic spinal extensors, deep cervical flexors, and scapular retractors to at least 4/5 in order to normalize tolerance to sitting for long periods of time. - ONGOING  3. Improve tenderness to palpation and spasm of right cervical paraspinals, scalenes, upper trapezius, and levator scapulae in order to improve tolerance to sleeping. - ONGOING  4. Improve ability for patient to control upper extremity symptoms with use of cervical traction at home and with travelling. - ONGOING  5. Improve modification of activities with reduction of cervical extension by reducing overhead activity and bringing work to eye and shoulder level. - ONGOING  6.  Improve Disabilities of the Arm, Shoulder and Hand Index score to 15/55 from 20/55. - ONGOING  Rehabilitation Potential For Stated Goals: Good  Regarding Glorine Rich Trevon's therapy, I certify that the treatment plan above will be carried out by a therapist or under their direction. Thank you for this referral,  Yaneli Bhatia PT     Referring Physician Signature: Bob Londono MD              Date                    The information in this section was collected on 4/7/2017 (except where otherwise noted). HISTORY:   History of Present Injury/Illness (Reason for Referral):  Mr. Griselda Hancock is a 64 y.o. male presenting with right sided arm and neck symptoms that started in 8/2016 gradually and recently produced right arm numbness into the thumb. He denies any history of trauma, but does report he sits a lot at a desk as a  and with flying for work every week. He reports no weakness into the upper extremity, but reports the pain is limiting his sleep, ability to sit at a desk, and ability to perform activities above shoulder height. He reports being eager to reduce his pain and learn to control his symptoms on his own. Past Medical History/Comorbidities:   Mr. Griselda Hancock  has a past medical history of Allergic rhinitis, cause unspecified (4/3/2014); Arthritis (3/13/2017); History of kidney stones (3/13/2017); Hyperlipidemia, unspecified (3/13/2017); IBS (irritable bowel syndrome) (3/13/2017); Migraines (3/13/2017); Nocturia (3/13/2017); and Osteoarthritis (3/13/2017). Mr. Griselda Hancock  has a past surgical history that includes other surgical and other surgical.  Social History/Living Environment:    Patient lives at home with wife but flies weekly to [place] for work and lives in an apartment. Therefore, he flies 2 times a week. Otherwise, he is independent to modified independence with all ADLs, household chores, and household activities such as home improvements and yard work. Prior Level of Function/Work/Activity:  Independent without dysfunction or pain.   Patient works as a  which requires sitting for long periods of time at a computer. He is also very active in and around his home with yard work and home improvements. Dominant Side:         LEFT  Current Medications:       Current Outpatient Prescriptions:     zolpidem (AMBIEN) 10 mg tablet, Take 1 Tab by mouth nightly as needed for Sleep. Max Daily Amount: 10 mg., Disp: 30 Tab, Rfl: 1    finasteride (PROSCAR) 5 mg tablet, Take 1 Tab by mouth daily. , Disp: 90 Tab, Rfl: 3    montelukast (SINGULAIR) 10 mg tablet, Take 10 mg by mouth daily. , Disp: , Rfl:     EPINEPHrine (EPIPEN) 0.3 mg/0.3 mL injection, 0.3 mg by IntraMUSCular route once as needed. , Disp: , Rfl:     SYRINGE W-NEEDLE,DISPOSAB,1 ML (ALLERGY SYRINGE), by Does Not Apply route., Disp: , Rfl:     aspirin 81 mg chewable tablet, Take 81 mg by mouth daily. , Disp: , Rfl:     calcium-vitamin D (OYSTER SHELL) 500 mg(1,250mg) -200 unit per tablet, Take 1 Tab by mouth two (2) times daily (with meals). , Disp: , Rfl:     fexofenadine (ALLEGRA) 180 mg tablet, Take  by mouth., Disp: , Rfl:     triamcinolone (NASACORT AQ) 55 mcg nasal inhaler, 2 Sprays daily. , Disp: , Rfl:    Date Last Reviewed:  5/12/2017   Number of Personal Factors/Comorbidities that affect the Plan of Care (job working at a computer, and high levels of activities in and around his home): 1-2: [address]:     Patient denies any increase of symptoms with coughing, sneezing or valsalva maneuver. Patient denies any headaches, changes in vision, dizziness, vertigo, nausea, drop attacks, black outs, tinnitus, dysphagia, dysarthria, LE symptoms or bowel/bladder dysfunction. Observation/Orthostatic Postural Assessment: Patient sits with mild to moderate forward head, forward shoulders and thoracic kyphosis. He is able to pull himself into neutral grossly without cuing, but still tends to hold his head in extension. He is able to reverse this with cuing but returns to this position due to poor postural awareness.   No significant deformity is noted.     Palpation: Gross tenderness to palpation and spasm of scalenes, upper trapezius, levator scapulae and suboccipitals on the right. Flexibility is severely limited of the upper trapezius, levator scapulae, suboccipitals, and pectoralis minor and major. Vertebral-Basilar Screen: Hautant's test is negative. Cranial extension test is negative. ROM: AROM of bilateral upper extremities is symmetrical, WNL and pain free. Cervical extension (degrees): 55°   Cervical flexion: 65°   Cervical left side bend: 45°   Cervical right side bend: 45°   Cervical left rotation: 70°   Cervical right rotation: 70°     Strength:  Deep cervical flexors 3-/5, scapular retractors 4/5, thoracic spinal extensors 4-/5. Joint Mobility:  Mild to moderate limitations with cervical traction. Severe limitations noted with posterior glide of occiput on C1 bilaterally. Special Tests: Ligament stress tests performed through upper cervical spine for transverse ligament including Sharp-Elizabeth test is negative, and Windsor-Axis shear test is negative. Windsor-Axis side flexion test for integrity of alar ligament is negative. Spurling test is positive on the right for upper extremity symptoms. Cervical distraction is positive for relief of neck pain and arm symptoms on the right. Neurovascular testing for thoracic outlet syndrome is negative. Negative impingement testing (Neer and Hartmann), negative painful arc, negative rotator cuff testing, positive Harwood's for clunk but no pain in the shoulder, positive Clunk test.    Neurological Screen:  Myotomes: Key muscle strength testing for bilateral UE is WNL. Dermatomes: Sensation testing through bilateral upper quadrants for light touch is WNL. Reflexes: Biceps (C5), brachioradialis (C6), and triceps (C7) are **2+, 2+ and 2+. Neural tension tests: Upper limb tension test is negative. Slump test is negative.     Functional Mobility:  Patient reports independence to modified independence with all ADLs, household chores, and personal hygiene. However, he requires assistance with moderate to heavy lifting and activities performed overhead. Body Structures Involved:  1. Bones  2. Joints  3. Muscles  4. Ligaments Body Functions Affected:  1. Sensory/Pain  2. Neuromusculoskeletal Activities and Participation Affected:  1. Domestic Life  2. Job working at a computer   Number of elements (examined above) that affect the Plan of Care: 3: MODERATE COMPLEXITY   CLINICAL PRESENTATION:   Presentation: Evolving clinical presentation with changing clinical characteristics: MODERATE COMPLEXITY   CLINICAL DECISION MAKING:   Outcome Measure: Tool Used: Disabilities of the Arm, Shoulder and Hand (DASH) Questionnaire - Quick Version  Score:  Initial: 20/55  Most Recent: X/55 (Date: -- )   Interpretation of Score: The DASH is designed to measure the activities of daily living in person's with upper extremity dysfunction or pain. Each section is scored on a 1-5 scale, 5 representing the greatest disability. The scores of each section are added together for a total score of 55. Tool Used: Neck Disability Index (NDI)  Score:  Initial: 1/50  Most Recent: /50 (Date: -- )   Interpretation of Score: The Neck Disability Index is a revised form of the Oswestry Low Back Pain Index and is designed to measure the activities of daily living in person's with neck pain. Each section is scored on a 0-5 scale, 5 representing the greatest disability. The scores of each section are added together for a total score of 50. Medical Necessity:   · Patient is expected to demonstrate progress in strength, range of motion, balance and coordination to improve tolerance to home improvement projects, yard work, work at a computer and sleeping.   · Skilled intervention continues to be required due to weakness, decreased ROM, decreased flexibility, decreased joint mobility, postural dysfunction, and tenderness to palpation. Reason for Services/Other Comments:  · Patient continues to require skilled intervention due to increased complexity of exercises. Use of outcome tool(s) and clinical judgement create a POC that gives a: Questionable prediction of patient's progress: MODERATE COMPLEXITY            TREATMENT:   (In addition to Assessment/Re-Assessment sessions the following treatments were rendered)    Pre-treatment Symptoms/Complaints:  Patient reported some improvements overall and less pain in the shoulder/neck. He has been using home pneumatic traction unit. Pain: Initial:   Pain Intensity 1: 2  Pain Location 1: Arm, Shoulder  Pain Orientation 1: Right  Post Session: 0-1/10     Therapeutic Exercise: (30 Minutes):  Exercises per grid below to improve mobility, strength, balance and coordination. Required minimal visual, verbal and manual cues to promote proper body alignment, promote proper body posture and promote proper body mechanics. Progressed resistance, range, repetitions and complexity of movement as indicated.   (used abbreviations: BET-back education training) Date:  5-5-17 Date:  5-12-17 Date:  4-28-`17   Activity/Exercise Parameters Parameters Parameters   Seated scapular retraction  3 x 10 with cueing 2 x 10   Levator scapulae stretch 2 x 30 sec 3 x 30 sec 2 x 30 sec   Upper thoracic stretch 2 x 30 sec 3 x 30 sec    Pec minor stretch with arms behind back 2 x 30 sec 3 x 30 sec    Doorway stretch 2 x 30 sec 3 x 30 sec 3 x 30 sec   ER/IR band Green 2 x 15 Blue 3 x 10 Green 2 x 12   UBE 3 min each direction level 4 Level 4, 4 forward, 4 backward 4 min forward  2 min reverse   Rows - seated good posture Blue band 2 x 15 Blue 3 x 10 seated Blue band 2 x 12   Low rows - seated good posture Green band 2 x 15 Blue 3 x 10 seated Green 2 x 12   Bilateral band ER ---     Horizontal abduction band ---     Prone Y and T ---                 Manual Therapy (    Soft Tissue Mobilization Duration  Duration: 20 Minutes): improve joint and soft tissue mobility   In supine manual cervical traction,occipital release,stretching upper trapezius and cervical mobilization. Increased tingling and radiating pain with mobilization technique right C4-C6      (Used abbreviations: MET - muscle energy technique; PNF - proprioceptive neuromuscular facilitation; NMR - neuromuscular re-education; a/p - anterior to posterior; p/a - posterior to anterio; NT - not tested)    Therapeutic Modalities: for edema and pain               Cervical Spine Heat  Type: Moist pack  Duration: 15 minutes  Patient Position: Sitting                             Cervical Spine Electrical Stimulation  Type: Interferential  Placement: right cervical spine  # of Electrodes: 4  Duration : 15 minutes  Patient Position: Sitting                                                 HEP: As above; handouts given to patient for all exercises. Treatment/Session Assessment:    · Response to Treatment:  Patient reported fatigue with new exercises but no complaints overall. Significant time spent reviewing posture and the position of the cervical spine and its relation to nerve compression on the right. Continue to progress as tolerated with an emphasis on posture. · Compliance with Program/Exercises: compliant most of the time. · Recommendations/Intent for next treatment session: \"Next visit will focus on advancements to more challenging activities\".     Total Treatment Duration: 65 minutes  PT Patient Time In/Time Out  Time In: 0814  Time Out: [facility], PT

## 2017-05-19 ENCOUNTER — HOSPITAL ENCOUNTER (OUTPATIENT)
Dept: PHYSICAL THERAPY | Age: 57
Discharge: HOME | End: 2017-05-19
Payer: COMMERCIAL

## 2017-05-19 PROCEDURE — 97014 ELECTRIC STIMULATION THERAPY: CPT

## 2017-05-19 PROCEDURE — 97140 MANUAL THERAPY 1/> REGIONS: CPT

## 2017-05-19 PROCEDURE — 97110 THERAPEUTIC EXERCISES: CPT

## 2017-05-19 NOTE — PROGRESS NOTES
Hector Velasco  : [facility] [address], [place], [address] [place] [address]  [phone]   [phone]         OUTPATIENT PHYSICAL THERAPY:Daily Note 2017    ICD-10: Treatment Diagnosis 1: cervical radiculopathy (M54.12)              Treatment Diagnosis 2: right shoulder pain (M25.511)  Precautions: None  Allergies:   Review of patient's allergies indicates no known allergies. Fall Risk Score: 2 (? 5 = High Risk)  MD Orders: Evaluate and Treat MEDICAL/REFERRING DIAGNOSIS:  Cervicalgia [M54.2]  Pain in right shoulder [M25.511]  Other chronic pain [G89.29]   DATE OF ONSET: 2016 with gradual onset of pain in the neck and shoulder, with recent onset of right arm numbness into the thumb  REFERRING PHYSICIAN: Jack Rincon MD  RETURN PHYSICIAN APPOINTMENT: 2017     INITIAL ASSESSMENT:  Mr. Lady Mckeon is a 64 y.o. male presenting with right sided arm and neck symptoms that started in 2016 gradually and recently produced right arm numbness into the thumb. He denies any history of trauma, but does report he sits a lot at a desk as a  and with flying for work every week. He reports no weakness into the upper extremity, but reports the pain is limiting his sleep, ability to sit at a desk, and ability to perform activities above shoulder height. He reports being eager to reduce his pain and learn to control his symptoms on his own. He is a good candidate for skilled intervention with services to include manual therapy, modalities as needed, therapeutic exercises, postural re-education, and activity modification. PROBLEM LIST (Impacting functional limitations):  1. Decreased Strength  2. Decreased ADL/Functional Activities  3. Decreased Transfer Abilities  4. Decreased Ambulation Ability/Technique  5. Increased Pain  6. Decreased Activity Tolerance  7. Increased Fatigue  8.  Decreased Flexibility/Joint Mobility INTERVENTIONS PLANNED:  1. Cold  2. Cryotherapy  3. Electrical Stimulation  4. Heat  5. Home Exercise Program (HEP)  6. Manual Therapy  7. Neuromuscular Re-education/Strengthening  8. Range of Motion (ROM)  9. Therapeutic Exercise/Strengthening  10. Ultrasound (US)   TREATMENT PLAN:  Effective Dates: 4/7/2017 TO 6/1/2017. Frequency/Duration: 1 times a week for 8 weeks  GOALS: (Goals have been discussed and agreed upon with patient.)  Short-Term Functional Goals: Time Frame: 4/7/2017 to 5/5/2017  1. Patient demonstrates independence with home exercise program without verbal cueing provided by therapist. - GOAL MET  2. Improve seated posture with decreased forward head, forward shoulders and thoracic kyphosis with use of lumbar roll and appropriate adjustment of computer work space. - GOAL MET  3. Improve flexibility of pectoralis minor and major in order to improve neutral spine posture for work and with travelling. - GOAL MET  Discharge Goals: Time Frame: 4/7/2017 to 6/1/2017  1. Improve pain to 4/10 at the most with sitting for long periods of time, flying weekly, and sleeping. - ONGOING  2. Improve gross strength of rotator cuff, thoracic spinal extensors, deep cervical flexors, and scapular retractors to at least 4/5 in order to normalize tolerance to sitting for long periods of time. - ONGOING  3. Improve tenderness to palpation and spasm of right cervical paraspinals, scalenes, upper trapezius, and levator scapulae in order to improve tolerance to sleeping. - ONGOING  4. Improve ability for patient to control upper extremity symptoms with use of cervical traction at home and with travelling. - GOAL MET  5. Improve modification of activities with reduction of cervical extension by reducing overhead activity and bringing work to eye and shoulder level. - ONGOING  6.  Improve Disabilities of the Arm, Shoulder and Hand Index score to 15/55 from 20/55. - ONGOING  Rehabilitation Potential For Stated Goals: Good  Regarding Greg Mauro Trevon's therapy, I certify that the treatment plan above will be carried out by a therapist or under their direction. Thank you for this referral,  Dina Vincent PT     Referring Physician Signature: Kya Lau MD              Date                    The information in this section was collected on 4/7/2017 (except where otherwise noted). HISTORY:   History of Present Injury/Illness (Reason for Referral):  Mr. Sandie Emanuel is a 64 y.o. male presenting with right sided arm and neck symptoms that started in 8/2016 gradually and recently produced right arm numbness into the thumb. He denies any history of trauma, but does report he sits a lot at a desk as a  and with flying for work every week. He reports no weakness into the upper extremity, but reports the pain is limiting his sleep, ability to sit at a desk, and ability to perform activities above shoulder height. He reports being eager to reduce his pain and learn to control his symptoms on his own. Past Medical History/Comorbidities:   Mr. Sandie Emanuel  has a past medical history of Allergic rhinitis, cause unspecified (4/3/2014); Arthritis (3/13/2017); History of kidney stones (3/13/2017); Hyperlipidemia, unspecified (3/13/2017); IBS (irritable bowel syndrome) (3/13/2017); Migraines (3/13/2017); Nocturia (3/13/2017); and Osteoarthritis (3/13/2017). Mr. Sandie Emanuel  has a past surgical history that includes other surgical and other surgical.  Social History/Living Environment:    Patient lives at home with wife but flies weekly to [place], [place] for work and lives in an apartment. Therefore, he flies 2 times a week. Otherwise, he is independent to modified independence with all ADLs, household chores, and household activities such as home improvements and yard work. Prior Level of Function/Work/Activity:  Independent without dysfunction or pain.   Patient works as a  which requires sitting for long periods of time at a computer. He is also very active in and around his home with yard work and home improvements. Dominant Side:         LEFT  Current Medications:       Current Outpatient Prescriptions:     zolpidem (AMBIEN) 10 mg tablet, Take 1 Tab by mouth nightly as needed for Sleep. Max Daily Amount: 10 mg., Disp: 30 Tab, Rfl: 1    finasteride (PROSCAR) 5 mg tablet, Take 1 Tab by mouth daily. , Disp: 90 Tab, Rfl: 3    montelukast (SINGULAIR) 10 mg tablet, Take 10 mg by mouth daily. , Disp: , Rfl:     EPINEPHrine (EPIPEN) 0.3 mg/0.3 mL injection, 0.3 mg by IntraMUSCular route once as needed. , Disp: , Rfl:     SYRINGE W-NEEDLE,DISPOSAB,1 ML (ALLERGY SYRINGE), by Does Not Apply route., Disp: , Rfl:     aspirin 81 mg chewable tablet, Take 81 mg by mouth daily. , Disp: , Rfl:     calcium-vitamin D (OYSTER SHELL) 500 mg(1,250mg) -200 unit per tablet, Take 1 Tab by mouth two (2) times daily (with meals). , Disp: , Rfl:     fexofenadine (ALLEGRA) 180 mg tablet, Take  by mouth., Disp: , Rfl:     triamcinolone (NASACORT AQ) 55 mcg nasal inhaler, 2 Sprays daily. , Disp: , Rfl:    Date Last Reviewed:  5/19/2017   Number of Personal Factors/Comorbidities that affect the Plan of Care (job working at a computer, and high levels of activities in and around his home): 1-2: [address]:     Patient denies any increase of symptoms with coughing, sneezing or valsalva maneuver. Patient denies any headaches, changes in vision, dizziness, vertigo, nausea, drop attacks, black outs, tinnitus, dysphagia, dysarthria, LE symptoms or bowel/bladder dysfunction. Observation/Orthostatic Postural Assessment: Patient sits with mild to moderate forward head, forward shoulders and thoracic kyphosis. He is able to pull himself into neutral grossly without cuing, but still tends to hold his head in extension. He is able to reverse this with cuing but returns to this position due to poor postural awareness.   No significant deformity is noted.     Palpation: Gross tenderness to palpation and spasm of scalenes, upper trapezius, levator scapulae and suboccipitals on the right. Flexibility is severely limited of the upper trapezius, levator scapulae, suboccipitals, and pectoralis minor and major. Vertebral-Basilar Screen: Hautant's test is negative. Cranial extension test is negative. ROM: AROM of bilateral upper extremities is symmetrical, WNL and pain free. Cervical extension (degrees): 55°   Cervical flexion: 65°   Cervical left side bend: 45°   Cervical right side bend: 45°   Cervical left rotation: 70°   Cervical right rotation: 70°     Strength:  Deep cervical flexors 3-/5, scapular retractors 4/5, thoracic spinal extensors 4-/5. Joint Mobility:  Mild to moderate limitations with cervical traction. Severe limitations noted with posterior glide of occiput on C1 bilaterally. Special Tests: Ligament stress tests performed through upper cervical spine for transverse ligament including Sharp-Elizabeth test is negative, and Bradford-Axis shear test is negative. Bradford-Axis side flexion test for integrity of alar ligament is negative. Spurling test is positive on the right for upper extremity symptoms. Cervical distraction is positive for relief of neck pain and arm symptoms on the right. Neurovascular testing for thoracic outlet syndrome is negative. Negative impingement testing (Neer and Hartmann), negative painful arc, negative rotator cuff testing, positive Melrude's for clunk but no pain in the shoulder, positive Clunk test.    Neurological Screen:  Myotomes: Key muscle strength testing for bilateral UE is WNL. Dermatomes: Sensation testing through bilateral upper quadrants for light touch is WNL. Reflexes: Biceps (C5), brachioradialis (C6), and triceps (C7) are **2+, 2+ and 2+. Neural tension tests: Upper limb tension test is negative. Slump test is negative.     Functional Mobility:  Patient reports independence to modified independence with all ADLs, household chores, and personal hygiene. However, he requires assistance with moderate to heavy lifting and activities performed overhead. Body Structures Involved:  1. Bones  2. Joints  3. Muscles  4. Ligaments Body Functions Affected:  1. Sensory/Pain  2. Neuromusculoskeletal Activities and Participation Affected:  1. Domestic Life  2. Job working at a computer   Number of elements (examined above) that affect the Plan of Care: 3: MODERATE COMPLEXITY   CLINICAL PRESENTATION:   Presentation: Evolving clinical presentation with changing clinical characteristics: MODERATE COMPLEXITY   CLINICAL DECISION MAKING:   Outcome Measure: Tool Used: Disabilities of the Arm, Shoulder and Hand (DASH) Questionnaire - Quick Version  Score:  Initial: 20/55  Most Recent: X/55 (Date: -- )   Interpretation of Score: The DASH is designed to measure the activities of daily living in person's with upper extremity dysfunction or pain. Each section is scored on a 1-5 scale, 5 representing the greatest disability. The scores of each section are added together for a total score of 55. Tool Used: Neck Disability Index (NDI)  Score:  Initial: 1/50  Most Recent: /50 (Date: -- )   Interpretation of Score: The Neck Disability Index is a revised form of the Oswestry Low Back Pain Index and is designed to measure the activities of daily living in person's with neck pain. Each section is scored on a 0-5 scale, 5 representing the greatest disability. The scores of each section are added together for a total score of 50. Medical Necessity:   · Patient is expected to demonstrate progress in strength, range of motion, balance and coordination to improve tolerance to home improvement projects, yard work, work at a computer and sleeping.   · Skilled intervention continues to be required due to weakness, decreased ROM, decreased flexibility, decreased joint mobility, postural dysfunction, and tenderness to palpation. Reason for Services/Other Comments:  · Patient continues to require skilled intervention due to increased complexity of exercises. Use of outcome tool(s) and clinical judgement create a POC that gives a: Questionable prediction of patient's progress: MODERATE COMPLEXITY            TREATMENT:   (In addition to Assessment/Re-Assessment sessions the following treatments were rendered)    Pre-treatment Symptoms/Complaints:  Patient reported minimal complaints of pain today and is doing well overall. Pain: Initial:   Pain Intensity 1: 1  Pain Location 1: Arm, Shoulder  Pain Orientation 1: Right  Post Session: 0-1/10     Therapeutic Exercise: (30 Minutes):  Exercises per grid below to improve mobility, strength, balance and coordination. Required minimal visual, verbal and manual cues to promote proper body alignment, promote proper body posture and promote proper body mechanics. Progressed resistance, range, repetitions and complexity of movement as indicated.   (used abbreviations: BET-back education training) Date:  5-5-17 Date:  5-12-17 Date:  5-19-17   Activity/Exercise Parameters Parameters Parameters   Seated scapular retraction  3 x 10 with cueing ---   Levator scapulae stretch 2 x 30 sec 3 x 30 sec 1 x 30 sec   Upper thoracic stretch 2 x 30 sec 3 x 30 sec 1 x 30 sec   Pec minor stretch with arms behind back 2 x 30 sec 3 x 30 sec 1 x 30 sec   Doorway stretch 2 x 30 sec 3 x 30 sec 1 x 30 sec   ER/IR band Green 2 x 15 Blue 3 x 10 Blue 3 x 10   UBE 3 min each direction level 4 Level 4, 4 forward, 4 backward 4 min forward  4 min reverse; level 4   Rows - seated good posture on blue ball Blue band 2 x 15 Blue 3 x 10 seated Blue band 3 x 10   Low rows - seated good posture on blue ball Green band 2 x 15 Blue 3 x 10 seated Blue band 3 x 10   Bilateral band ER --- Green 2 x 10 Green 3 x 10   Horizontal abduction band --- Green 2 x 10 Green 3 x 10   Prone Y and T --- 2 x 10 2 x 10 neutral head   Chin tuck --- --- 5 sec x 10         Manual Therapy (    Soft Tissue Mobilization Duration  Duration: 15 Minutes): improve joint and soft tissue mobility  · In supine manual cervical traction,occipital release,stretching upper trapezius and cervical mobilization. · Prone p/a mobilization of upper thoracic spine through spinous processes and transverse processes over blue bolster  (Used abbreviations: MET - muscle energy technique; PNF - proprioceptive neuromuscular facilitation; NMR - neuromuscular re-education; a/p - anterior to posterior; p/a - posterior to anterio; NT - not tested)    Therapeutic Modalities: for edema and pain               Cervical Spine Heat  Type: Moist pack  Duration: 10 minutes  Patient Position: Sitting                             Cervical Spine Electrical Stimulation  Type: Interferential  Placement: right cervical spine  # of Electrodes: 4  Duration : 10 minutes  Patient Position: Sitting                                                 HEP: As above; handouts given to patient for all exercises. Treatment/Session Assessment:    · Response to Treatment:  Patient reported improvements overall with endurance and strength with exercises. No tingling with exercises today. Tolerated session well. Continue to progress posture and core strengthening exercises. · Compliance with Program/Exercises: compliant most of the time. · Recommendations/Intent for next treatment session: \"Next visit will focus on advancements to more challenging activities\".     Total Treatment Duration: 55 minutes  PT Patient Time In/Time Out  Time In: 4010  Time Out: [address]

## 2017-05-26 ENCOUNTER — HOSPITAL ENCOUNTER (OUTPATIENT)
Dept: PHYSICAL THERAPY | Age: 57
Discharge: HOME | End: 2017-05-26
Payer: COMMERCIAL

## 2017-05-26 PROCEDURE — 97110 THERAPEUTIC EXERCISES: CPT

## 2017-05-26 PROCEDURE — 97014 ELECTRIC STIMULATION THERAPY: CPT

## 2017-05-26 NOTE — PROGRESS NOTES
Charli Cool  : [facility] [address], [place], [address] [place] [address]  [phone]   [phone]         OUTPATIENT PHYSICAL THERAPY:Daily Note 2017    ICD-10: Treatment Diagnosis 1: cervical radiculopathy (M54.12)              Treatment Diagnosis 2: right shoulder pain (M25.511)  Precautions: None  Allergies:   Review of patient's allergies indicates no known allergies. Fall Risk Score: 2 (? 5 = High Risk)  MD Orders: Evaluate and Treat MEDICAL/REFERRING DIAGNOSIS:  Cervicalgia [M54.2]  Pain in right shoulder [M25.511]  Other chronic pain [G89.29]   DATE OF ONSET: 2016 with gradual onset of pain in the neck and shoulder, with recent onset of right arm numbness into the thumb  REFERRING PHYSICIAN: Edwina Pedraza MD  RETURN PHYSICIAN APPOINTMENT: 2017     INITIAL ASSESSMENT:  Mr. Moses Waldron is a 64 y.o. male presenting with right sided arm and neck symptoms that started in 2016 gradually and recently produced right arm numbness into the thumb. He denies any history of trauma, but does report he sits a lot at a desk as a  and with flying for work every week. He reports no weakness into the upper extremity, but reports the pain is limiting his sleep, ability to sit at a desk, and ability to perform activities above shoulder height. He reports being eager to reduce his pain and learn to control his symptoms on his own. He is a good candidate for skilled intervention with services to include manual therapy, modalities as needed, therapeutic exercises, postural re-education, and activity modification. PROBLEM LIST (Impacting functional limitations):  1. Decreased Strength  2. Decreased ADL/Functional Activities  3. Decreased Transfer Abilities  4. Decreased Ambulation Ability/Technique  5. Increased Pain  6. Decreased Activity Tolerance  7. Increased Fatigue  8.  Decreased Flexibility/Joint Mobility INTERVENTIONS PLANNED:  1. Cold  2. Cryotherapy  3. Electrical Stimulation  4. Heat  5. Home Exercise Program (HEP)  6. Manual Therapy  7. Neuromuscular Re-education/Strengthening  8. Range of Motion (ROM)  9. Therapeutic Exercise/Strengthening  10. Ultrasound (US)   TREATMENT PLAN:  Effective Dates: 4/7/2017 TO 6/1/2017. Frequency/Duration: 1 times a week for 8 weeks  GOALS: (Goals have been discussed and agreed upon with patient.)  Short-Term Functional Goals: Time Frame: 4/7/2017 to 5/5/2017  1. Patient demonstrates independence with home exercise program without verbal cueing provided by therapist. - GOAL MET  2. Improve seated posture with decreased forward head, forward shoulders and thoracic kyphosis with use of lumbar roll and appropriate adjustment of computer work space. - GOAL MET  3. Improve flexibility of pectoralis minor and major in order to improve neutral spine posture for work and with travelling. - GOAL MET  Discharge Goals: Time Frame: 4/7/2017 to 6/1/2017  1. Improve pain to 4/10 at the most with sitting for long periods of time, flying weekly, and sleeping. - ONGOING  2. Improve gross strength of rotator cuff, thoracic spinal extensors, deep cervical flexors, and scapular retractors to at least 4/5 in order to normalize tolerance to sitting for long periods of time. - ONGOING  3. Improve tenderness to palpation and spasm of right cervical paraspinals, scalenes, upper trapezius, and levator scapulae in order to improve tolerance to sleeping. - ONGOING  4. Improve ability for patient to control upper extremity symptoms with use of cervical traction at home and with travelling. - GOAL MET  5. Improve modification of activities with reduction of cervical extension by reducing overhead activity and bringing work to eye and shoulder level. - ONGOING  6.  Improve Disabilities of the Arm, Shoulder and Hand Index score to 15/55 from 20/55. - ONGOING  Rehabilitation Potential For Stated Goals: Good  Regarding Sarita López Trevon's therapy, I certify that the treatment plan above will be carried out by a therapist or under their direction. Thank you for this referral,  Tony Quintanilla PTA     Referring Physician Signature: Jack Rincon MD              Date                    The information in this section was collected on 4/7/2017 (except where otherwise noted). HISTORY:   History of Present Injury/Illness (Reason for Referral):  Mr. Lady Mckeon is a 64 y.o. male presenting with right sided arm and neck symptoms that started in 8/2016 gradually and recently produced right arm numbness into the thumb. He denies any history of trauma, but does report he sits a lot at a desk as a  and with flying for work every week. He reports no weakness into the upper extremity, but reports the pain is limiting his sleep, ability to sit at a desk, and ability to perform activities above shoulder height. He reports being eager to reduce his pain and learn to control his symptoms on his own. Past Medical History/Comorbidities:   Mr. Lady Mckeon  has a past medical history of Allergic rhinitis, cause unspecified (4/3/2014); Arthritis (3/13/2017); History of kidney stones (3/13/2017); Hyperlipidemia, unspecified (3/13/2017); IBS (irritable bowel syndrome) (3/13/2017); Migraines (3/13/2017); Nocturia (3/13/2017); and Osteoarthritis (3/13/2017). Mr. Lady Mckeon  has a past surgical history that includes other surgical and other surgical.  Social History/Living Environment:    Patient lives at home with wife but flies weekly to [facility] for work and lives in an apartment. Therefore, he flies 2 times a week. Otherwise, he is independent to modified independence with all ADLs, household chores, and household activities such as home improvements and yard work. Prior Level of Function/Work/Activity:  Independent without dysfunction or pain.   Patient works as a  which requires sitting for long periods of time at a computer. He is also very active in and around his home with yard work and home improvements. Dominant Side:         LEFT  Current Medications:       Current Outpatient Prescriptions:     zolpidem (AMBIEN) 10 mg tablet, Take 1 Tab by mouth nightly as needed for Sleep. Max Daily Amount: 10 mg., Disp: 30 Tab, Rfl: 1    finasteride (PROSCAR) 5 mg tablet, Take 1 Tab by mouth daily. , Disp: 90 Tab, Rfl: 3    montelukast (SINGULAIR) 10 mg tablet, Take 10 mg by mouth daily. , Disp: , Rfl:     EPINEPHrine (EPIPEN) 0.3 mg/0.3 mL injection, 0.3 mg by IntraMUSCular route once as needed. , Disp: , Rfl:     SYRINGE W-NEEDLE,DISPOSAB,1 ML (ALLERGY SYRINGE), by Does Not Apply route., Disp: , Rfl:     aspirin 81 mg chewable tablet, Take 81 mg by mouth daily. , Disp: , Rfl:     calcium-vitamin D (OYSTER SHELL) 500 mg(1,250mg) -200 unit per tablet, Take 1 Tab by mouth two (2) times daily (with meals). , Disp: , Rfl:     fexofenadine (ALLEGRA) 180 mg tablet, Take  by mouth., Disp: , Rfl:     triamcinolone (NASACORT AQ) 55 mcg nasal inhaler, 2 Sprays daily. , Disp: , Rfl:    Date Last Reviewed:  5/26/2017   Number of Personal Factors/Comorbidities that affect the Plan of Care (job working at a computer, and high levels of activities in and around his home): 1-2: [address]:     Patient denies any increase of symptoms with coughing, sneezing or valsalva maneuver. Patient denies any headaches, changes in vision, dizziness, vertigo, nausea, drop attacks, black outs, tinnitus, dysphagia, dysarthria, LE symptoms or bowel/bladder dysfunction. Observation/Orthostatic Postural Assessment: Patient sits with mild to moderate forward head, forward shoulders and thoracic kyphosis. He is able to pull himself into neutral grossly without cuing, but still tends to hold his head in extension. He is able to reverse this with cuing but returns to this position due to poor postural awareness.   No significant deformity is noted.     Palpation: Gross tenderness to palpation and spasm of scalenes, upper trapezius, levator scapulae and suboccipitals on the right. Flexibility is severely limited of the upper trapezius, levator scapulae, suboccipitals, and pectoralis minor and major. Vertebral-Basilar Screen: Hautant's test is negative. Cranial extension test is negative. ROM: AROM of bilateral upper extremities is symmetrical, WNL and pain free. Cervical extension (degrees): 55°   Cervical flexion: 65°   Cervical left side bend: 45°   Cervical right side bend: 45°   Cervical left rotation: 70°   Cervical right rotation: 70°     Strength:  Deep cervical flexors 3-/5, scapular retractors 4/5, thoracic spinal extensors 4-/5. Joint Mobility:  Mild to moderate limitations with cervical traction. Severe limitations noted with posterior glide of occiput on C1 bilaterally. Special Tests: Ligament stress tests performed through upper cervical spine for transverse ligament including Sharp-Elizabeth test is negative, and [place]-Axis shear test is negative. [place]-Axis side flexion test for integrity of alar ligament is negative. Spurling test is positive on the right for upper extremity symptoms. Cervical distraction is positive for relief of neck pain and arm symptoms on the right. Neurovascular testing for thoracic outlet syndrome is negative. Negative impingement testing (Neer and Hartmann), negative painful arc, negative rotator cuff testing, positive Pacoima's for clunk but no pain in the shoulder, positive Clunk test.    Neurological Screen:  Myotomes: Key muscle strength testing for bilateral UE is WNL. Dermatomes: Sensation testing through bilateral upper quadrants for light touch is WNL. Reflexes: Biceps (C5), brachioradialis (C6), and triceps (C7) are **2+, 2+ and 2+. Neural tension tests: Upper limb tension test is negative. Slump test is negative.     Functional Mobility:  Patient reports independence to modified independence with all ADLs, household chores, and personal hygiene. However, he requires assistance with moderate to heavy lifting and activities performed overhead. Body Structures Involved:  1. Bones  2. Joints  3. Muscles  4. Ligaments Body Functions Affected:  1. Sensory/Pain  2. Neuromusculoskeletal Activities and Participation Affected:  1. Domestic Life  2. Job working at a computer   Number of elements (examined above) that affect the Plan of Care: 3: MODERATE COMPLEXITY   CLINICAL PRESENTATION:   Presentation: Evolving clinical presentation with changing clinical characteristics: MODERATE COMPLEXITY   CLINICAL DECISION MAKING:   Outcome Measure: Tool Used: Disabilities of the Arm, Shoulder and Hand (DASH) Questionnaire - Quick Version  Score:  Initial: 20/55  Most Recent: X/55 (Date: -- )   Interpretation of Score: The DASH is designed to measure the activities of daily living in person's with upper extremity dysfunction or pain. Each section is scored on a 1-5 scale, 5 representing the greatest disability. The scores of each section are added together for a total score of 55. Tool Used: Neck Disability Index (NDI)  Score:  Initial: 1/50  Most Recent: /50 (Date: -- )   Interpretation of Score: The Neck Disability Index is a revised form of the Oswestry Low Back Pain Index and is designed to measure the activities of daily living in person's with neck pain. Each section is scored on a 0-5 scale, 5 representing the greatest disability. The scores of each section are added together for a total score of 50. Medical Necessity:   · Patient is expected to demonstrate progress in strength, range of motion, balance and coordination to improve tolerance to home improvement projects, yard work, work at a computer and sleeping.   · Skilled intervention continues to be required due to weakness, decreased ROM, decreased flexibility, decreased joint mobility, postural dysfunction, and tenderness to palpation. Reason for Services/Other Comments:  · Patient continues to require skilled intervention due to increased complexity of exercises. Use of outcome tool(s) and clinical judgement create a POC that gives a: Questionable prediction of patient's progress: MODERATE COMPLEXITY            TREATMENT:   (In addition to Assessment/Re-Assessment sessions the following treatments were rendered)    Pre-treatment Symptoms/Complaints:  Patient states he no longer has tingling in his fingers. Main C/O is intermittent upper arm and shoulder pain with static positions. Pain: Initial:   Pain Intensity 1: 1  Pain Location 1: Arm, Shoulder  Pain Orientation 1: Right  Post Session: 0-1/10     Therapeutic Exercise: (35 Minutes):  Exercises per grid below to improve mobility, strength, balance and coordination. Required minimal visual, verbal and manual cues to promote proper body alignment, promote proper body posture and promote proper body mechanics. Progressed resistance, range, repetitions and complexity of movement as indicated.   (used abbreviations: BET-back education training) Date:  5-26-17 Date:  5-12-17 Date:  5-19-17   Activity/Exercise Parameters Parameters Parameters   Seated scapular retraction  3 x 10 with cueing ---   Levator scapulae stretch 2 x 30 sec 3 x 30 sec 1 x 30 sec   Upper thoracic stretch 2 x 30 sec 3 x 30 sec 1 x 30 sec   Pec minor stretch with arms behind back 2 x 30 sec 3 x 30 sec 1 x 30 sec   Doorway stretch 2 x 30 sec 3 x 30 sec 1 x 30 sec   ER/IR band Blue 3 x 10 Blue 3 x 10 Blue 3 x 10   UBE 4 min each direction level 5 Level 4, 4 forward, 4 backward 4 min forward  4 min reverse; level 4   Rows - seated good posture on blue ball Blue band 3 x 10 Blue 3 x 10 seated Blue band 3 x 10   Low rows - seated good posture on blue ball Green band 3 x 10 Blue 3 x 10 seated Blue band 3 x 10   Bilateral band ER Green 3 x 10 Green 2 x 10 Green 3 x 10   Horizontal abduction band Green 3 x 10 Green 2 x 10 Green 3 x 10   Prone Y and T 2 x 10 2 x 10 2 x 10 neutral head   Chin tuck --- --- 5 sec x 10         Manual Therapy (     ):none today   improve joint and soft tissue mobility  · In supine manual cervical traction,occipital release,stretching upper trapezius and cervical mobilization. · Prone p/a mobilization of upper thoracic spine through spinous processes and transverse processes over blue bolster  (Used abbreviations: MET - muscle energy technique; PNF - proprioceptive neuromuscular facilitation; NMR - neuromuscular re-education; a/p - anterior to posterior; p/a - posterior to anterio; NT - not tested)    Therapeutic Modalities: for edema and pain               Cervical Spine Heat  Type: Moist pack  Duration: 15 minutes  Patient Position: Sitting                             Cervical Spine Electrical Stimulation  Type: Interferential  Placement: R cervical spine  # of Electrodes: 4  Duration : 15 minutes  Patient Position: Sitting                                                 HEP: As above; handouts given to patient for all exercises. Treatment/Session Assessment:    · Response to Treatment: no C/O with exercises. Over all improved posture control and decreased radicular symptoms  · Compliance with Program/Exercises: compliant most of the time. · Recommendations/Intent for next treatment session: \"Next visit will focus on advancements to more challenging activities\".     Total Treatment Duration: 50 minutes  PT Patient Time In/Time Out  Time In: 0800  Time Out: [address] [facility]

## 2017-06-02 ENCOUNTER — HOSPITAL ENCOUNTER (OUTPATIENT)
Dept: PHYSICAL THERAPY | Age: 57
Discharge: HOME | End: 2017-06-02
Payer: COMMERCIAL

## 2017-06-02 PROCEDURE — 97140 MANUAL THERAPY 1/> REGIONS: CPT

## 2017-06-02 PROCEDURE — 97110 THERAPEUTIC EXERCISES: CPT

## 2017-06-02 NOTE — PROGRESS NOTES
Erika Rodrigues  : [facility] [address], [place], [address] [place] [address]  [phone]   [phone]         OUTPATIENT PHYSICAL THERAPY:Daily Note and Discharge 2017    ICD-10: Treatment Diagnosis 1: cervical radiculopathy (M54.12)              Treatment Diagnosis 2: right shoulder pain (M25.511)  Precautions: None  Allergies:   Review of patient's allergies indicates no known allergies. Fall Risk Score: 2 (? 5 = High Risk)  MD Orders: Evaluate and Treat MEDICAL/REFERRING DIAGNOSIS:  Cervicalgia [M54.2]  Pain in right shoulder [M25.511]  Other chronic pain [G89.29]   DATE OF ONSET: 2016 with gradual onset of pain in the neck and shoulder, with recent onset of right arm numbness into the thumb  REFERRING PHYSICIAN: Joesph Weiss MD  RETURN PHYSICIAN APPOINTMENT: 2017     INITIAL ASSESSMENT:  Mr. Jose Verdugo is a 64 y.o. male presenting with right sided arm and neck symptoms that started in 2016 gradually and recently produced right arm numbness into the thumb. He denies any history of trauma, but does report he sits a lot at a desk as a  and with flying for work every week. Patient has been seen for 8 sessions of therapy from 2017 to 2017 with significant success. He reports almost complete abatement of hand and arm symptoms at this time, as well as improvements overall with flexibility and posture. He has also seen a chiropractor a couple times which has significantly reduced his neck symptoms. He has met most preset goals and is discharged at this time. PROBLEM LIST (Impacting functional limitations):  1. Decreased Strength  2. Decreased ADL/Functional Activities  3. Decreased Transfer Abilities  4. Decreased Ambulation Ability/Technique  5. Increased Pain  6. Decreased Activity Tolerance  7. Increased Fatigue  8. Decreased Flexibility/Joint Mobility INTERVENTIONS PLANNED:  1. Cold  2. Cryotherapy  3.  Electrical Stimulation  4. Heat  5. Home Exercise Program (HEP)  6. Manual Therapy  7. Neuromuscular Re-education/Strengthening  8. Range of Motion (ROM)  9. Therapeutic Exercise/Strengthening  10. Ultrasound (US)   TREATMENT PLAN:  Effective Dates: 4/7/2017 TO 6/1/2017. Frequency/Duration: Patient has been seen for 8 sessions of therapy from 4/7/2017 to 6/2/2017 with significant success. He reports almost complete abatement of hand and arm symptoms at this time, as well as improvements overall with flexibility and posture. He has also seen a chiropractor a couple times which has significantly reduced his neck symptoms. He has met most preset goals and is discharged at this time. GOALS: (Goals have been discussed and agreed upon with patient.)  Short-Term Functional Goals: Time Frame: 4/7/2017 to 5/5/2017  1. Patient demonstrates independence with home exercise program without verbal cueing provided by therapist. - GOAL MET  2. Improve seated posture with decreased forward head, forward shoulders and thoracic kyphosis with use of lumbar roll and appropriate adjustment of computer work space. - GOAL MET  3. Improve flexibility of pectoralis minor and major in order to improve neutral spine posture for work and with travelling. - GOAL MET  Discharge Goals: Time Frame: 4/7/2017 to 6/1/2017  1. Improve pain to 4/10 at the most with sitting for long periods of time, flying weekly, and sleeping. - GOAL MET  2. Improve gross strength of rotator cuff, thoracic spinal extensors, deep cervical flexors, and scapular retractors to at least 4/5 in order to normalize tolerance to sitting for long periods of time. - GOAL MET  3. Improve tenderness to palpation and spasm of right cervical paraspinals, scalenes, upper trapezius, and levator scapulae in order to improve tolerance to sleeping. - GOAL MET  4.  Improve ability for patient to control upper extremity symptoms with use of cervical traction at home and with travelling. - GOAL MET  5. Improve modification of activities with reduction of cervical extension by reducing overhead activity and bringing work to eye and shoulder level. - GOAL MET  6. Improve Disabilities of the Arm, Shoulder and Hand Index score to 15/55 from 20/55. - GOAL MET  Rehabilitation Potential For Stated Goals: Good  Regarding aJvy Lawler therapy, I certify that the treatment plan above will be carried out by a therapist or under their direction. Thank you for this referral,  Anny Petty PT     Referring Physician Signature: Wei Rodriguez MD              Date                    The information in this section was collected on 4/7/2017 (except where otherwise noted). HISTORY:   History of Present Injury/Illness (Reason for Referral):  Mr. Singh Osei is a 64 y.o. male presenting with right sided arm and neck symptoms that started in 8/2016 gradually and recently produced right arm numbness into the thumb. He denies any history of trauma, but does report he sits a lot at a desk as a  and with flying for work every week. He reports no weakness into the upper extremity, but reports the pain is limiting his sleep, ability to sit at a desk, and ability to perform activities above shoulder height. He reports being eager to reduce his pain and learn to control his symptoms on his own. Past Medical History/Comorbidities:   Mr. Singh Osei  has a past medical history of Allergic rhinitis, cause unspecified (4/3/2014); Arthritis (3/13/2017); History of kidney stones (3/13/2017); Hyperlipidemia, unspecified (3/13/2017); IBS (irritable bowel syndrome) (3/13/2017); Migraines (3/13/2017); Nocturia (3/13/2017); and Osteoarthritis (3/13/2017). Mr. Singh Osei  has a past surgical history that includes other surgical and other surgical.  Social History/Living Environment:    Patient lives at home with wife but [place] to [place] for work and lives in an apartment. Therefore, he flies 2 times a week. Otherwise, he is independent to modified independence with all ADLs, household chores, and household activities such as home improvements and yard work. Prior Level of Function/Work/Activity:  Independent without dysfunction or pain. Patient works as a  which requires sitting for long periods of time at a computer. He is also very active in and around his home with yard work and home improvements. Dominant Side:         LEFT  Current Medications:       Current Outpatient Prescriptions:     zolpidem (AMBIEN) 10 mg tablet, Take 1 Tab by mouth nightly as needed for Sleep. Max Daily Amount: 10 mg., Disp: 30 Tab, Rfl: 1    finasteride (PROSCAR) 5 mg tablet, Take 1 Tab by mouth daily. , Disp: 90 Tab, Rfl: 3    montelukast (SINGULAIR) 10 mg tablet, Take 10 mg by mouth daily. , Disp: , Rfl:     EPINEPHrine (EPIPEN) 0.3 mg/0.3 mL injection, 0.3 mg by IntraMUSCular route once as needed. , Disp: , Rfl:     SYRINGE W-NEEDLE,DISPOSAB,1 ML (ALLERGY SYRINGE), by Does Not Apply route., Disp: , Rfl:     aspirin 81 mg chewable tablet, Take 81 mg by mouth daily. , Disp: , Rfl:     calcium-vitamin D (OYSTER SHELL) 500 mg(1,250mg) -200 unit per tablet, Take 1 Tab by mouth two (2) times daily (with meals). , Disp: , Rfl:     fexofenadine (ALLEGRA) 180 mg tablet, Take  by mouth., Disp: , Rfl:     triamcinolone (NASACORT AQ) 55 mcg nasal inhaler, 2 Sprays daily. , Disp: , Rfl:    Date Last Reviewed:  6/2/2017   Number of Personal Factors/Comorbidities that affect the Plan of Care (job working at a computer, and high levels of activities in and around his home): 1-2: [address]:     Patient denies any increase of symptoms with coughing, sneezing or valsalva maneuver. Patient denies any headaches, changes in vision, dizziness, vertigo, nausea, drop attacks, black outs, tinnitus, dysphagia, dysarthria, LE symptoms or bowel/bladder dysfunction.     Observation/Orthostatic Postural Assessment: Patient sits with mild (From mild to moderate) forward head, forward shoulders and thoracic kyphosis. He is able to pull himself into neutral grossly without cuing, but still tends to hold his head in extension but less severely than the start of therapy. He is able to reverse this with cuing and is able to hold the position longer (from returns to this position due to poor postural awareness). No significant deformity is noted. Palpation: Decreased gross tenderness to palpation and spasm of scalenes, upper trapezius, levator scapulae and suboccipitals on the right. Flexibility is minimally (From severely) limited of the upper trapezius, levator scapulae, suboccipitals, and pectoralis minor and major. Vertebral-Basilar Screen: Hautant's test is negative. Cranial extension test is negative. ROM: AROM of bilateral upper extremities is symmetrical, WNL and pain free. Cervical extension (degrees): 65 (From 55°)   Cervical flexion: 65°   Cervical left side bend: 55 (From 45)°   Cervical right side bend: 45°   Cervical left rotation: 70°   Cervical right rotation: 70°     Strength:  Deep cervical flexors 4 (From 3-/5), scapular retractors 4+ (From 4/5), thoracic spinal extensors 4+ (From 4-/5). Joint Mobility:  Mild to moderate limitations with cervical traction. Mild (From severe) limitations noted with posterior glide of occiput on C1 bilaterally. Special Tests: Ligament stress tests performed through upper cervical spine for transverse ligament including Sharp-Elizabeth test is negative, and [place]-Axis shear test is negative. [place]-Axis side flexion test for integrity of alar ligament is negative. Spurling test is positive on the right for upper extremity symptoms. Cervical distraction is positive for relief of neck pain and arm symptoms on the right. Neurovascular testing for thoracic outlet syndrome is negative.           Negative impingement testing (Neer and Hartmann), negative painful arc, negative rotator cuff testing, positive Bradford's for clunk but no pain in the shoulder, positive Clunk test.    Neurological Screen:  Myotomes: Key muscle strength testing for bilateral UE is WNL. Dermatomes: Sensation testing through bilateral upper quadrants for light touch is WNL. Reflexes: Biceps (C5), brachioradialis (C6), and triceps (C7) are 2+, 2+ and 2+. Neural tension tests: Upper limb tension test is negative. Slump test is negative. Functional Mobility:  Patient reports independence to modified independence with all ADLs, household chores, and personal hygiene. However, he no longer requires assistance with moderate to heavy lifting and activities performed overhead. Body Structures Involved:  1. Bones  2. Joints  3. Muscles  4. Ligaments Body Functions Affected:  1. Sensory/Pain  2. Neuromusculoskeletal Activities and Participation Affected:  1. Domestic Life  2. Job working at [email or web address]   Number of elements (examined above) that affect the Plan of Care: 3: MODERATE COMPLEXITY   CLINICAL PRESENTATION:   Presentation: Evolving clinical presentation with changing clinical characteristics: MODERATE COMPLEXITY   CLINICAL DECISION MAKING:   Outcome Measure: Tool Used: Disabilities of the Arm, Shoulder and Hand (DASH) Questionnaire - Quick Version  Score:  Initial: 20/55  Most Recent: 13/55 (Date: 6/2/2017)   Interpretation of Score: The DASH is designed to measure the activities of daily living in person's with upper extremity dysfunction or pain. Each section is scored on a 1-5 scale, 5 representing the greatest disability. The scores of each section are added together for a total score of 55. Tool Used: Neck Disability Index (NDI)  Score:  Initial: 1/50  Most Recent: /50 (Date: -- )   Interpretation of Score: The Neck Disability Index is a revised form of the Oswestry Low Back Pain Index and is designed to measure the activities of daily living in person's with neck pain.   Each section is scored on a 0-5 scale, 5 representing the greatest disability. The scores of each section are added together for a total score of 50. Medical Necessity:   · Patient has been seen for 8 sessions of therapy from 4/7/2017 to 6/2/2017 with significant success. He reports almost complete abatement of hand and arm symptoms at this time, as well as improvements overall with flexibility and posture. He has also seen a chiropractor a couple times which has significantly reduced his neck symptoms. He has met most preset goals and is discharged at this time. Use of outcome tool(s) and clinical judgement create a POC that gives a: Questionable prediction of patient's progress: MODERATE COMPLEXITY            TREATMENT:   (In addition to Assessment/Re-Assessment sessions the following treatments were rendered)    Pre-treatment Symptoms/Complaints:  Patient states he is ready for discharge and has noted an improvement with posture and strength since the start of therapy. Pain: Initial:   Pain Intensity 1: 1  Pain Location 1: Arm, Shoulder  Pain Orientation 1: Right  Post Session: 0-1/10     Therapeutic Exercise: (35 Minutes):  Exercises per grid below to improve mobility, strength, balance and coordination. Required minimal visual, verbal and manual cues to promote proper body alignment, promote proper body posture and promote proper body mechanics. Progressed resistance, range, repetitions and complexity of movement as indicated.   (used abbreviations: BET-back education training) Date:  5-26-17 Date:  6-2-17 Date:  5-19-17   Activity/Exercise Parameters Parameters Parameters   Seated scapular retraction  3 x 10 with cueing ---   Levator scapulae stretch 2 x 30 sec 1 x 30 sec 1 x 30 sec   Upper thoracic stretch 2 x 30 sec 1 x 30 sec 1 x 30 sec   Pec minor stretch with arms behind back 2 x 30 sec 1 x 30 sec 1 x 30 sec   Doorway stretch 2 x 30 sec 1 x 30 sec 1 x 30 sec   ER/IR band Blue 3 x 10 Blue 3 x 10 Blue 3 x 10   UBE 4 min each direction level 5 Level 4, 4 forward, 4 backward 4 min forward  4 min reverse; level 4   Rows - seated good posture on blue ball Blue band 3 x 10 Blue 3 x 10 seated Blue band 3 x 10   Low rows - seated good posture on blue ball Green band 3 x 10 Blue 3 x 10 seated Blue band 3 x 10   Bilateral band ER Green 3 x 10 Green 2 x 10 Green 3 x 10   Horizontal abduction band Green 3 x 10 Green 2 x 10 Green 3 x 10   Prone Y and T 2 x 10 2 x 10 2 x 10 neutral head   Chin tuck --- --- 5 sec x 10         Manual Therapy (    Soft Tissue Mobilization Duration  Duration: 20 Minutes):   improve joint and soft tissue mobility  · In supine manual cervical traction,occipital release,stretching upper trapezius and cervical mobilization. · Prone p/a mobilization of upper thoracic spine through spinous processes and transverse processes over blue bolster  (Used abbreviations: MET - muscle energy technique; PNF - proprioceptive neuromuscular facilitation; NMR - neuromuscular re-education; a/p - anterior to posterior; p/a - posterior to anterio; NT - not tested)    Therapeutic Modalities: for edema and pain                                                                                               HEP: As above; handouts given to patient for all exercises. Treatment/Session Assessment:    · Response to Treatment: Patient reported feeling ready for discharge at this time. Improvements noted overall. · Compliance with Program/Exercises: compliant most of the time. · Recommendations/Intent for next treatment session: Patient has been seen for 8 sessions of therapy from 4/7/2017 to 6/2/2017 with significant success. He reports almost complete abatement of hand and arm symptoms at this time, as well as improvements overall with flexibility and posture. He has also seen a chiropractor a couple times which has significantly reduced his neck symptoms.   He has met most preset goals and is discharged at this time.      Total Treatment Duration: 55 minutes  PT Patient Time In/Time Out  Time In: 0800  Time Out: [address], PT

## 2020-06-04 ENCOUNTER — HOSPITAL ENCOUNTER (OUTPATIENT)
Dept: LAB | Age: 60
Discharge: HOME | End: 2020-06-04

## 2020-06-04 PROCEDURE — 88305 TISSUE EXAM BY PATHOLOGIST: CPT

## 2021-08-03 PROBLEM — M19.90 OSTEOARTHRITIS: Status: RESOLVED | Noted: 2017-03-13 | Resolved: 2021-08-03
